# Patient Record
Sex: FEMALE | Race: WHITE | NOT HISPANIC OR LATINO | Employment: OTHER | URBAN - METROPOLITAN AREA
[De-identification: names, ages, dates, MRNs, and addresses within clinical notes are randomized per-mention and may not be internally consistent; named-entity substitution may affect disease eponyms.]

---

## 2017-03-07 ENCOUNTER — OFFICE VISIT - HEALTHEAST (OUTPATIENT)
Dept: FAMILY MEDICINE | Facility: CLINIC | Age: 72
End: 2017-03-07

## 2017-03-07 DIAGNOSIS — H69.90 EUSTACHIAN TUBE DYSFUNCTION: ICD-10-CM

## 2017-03-07 DIAGNOSIS — J30.2 SEASONAL ALLERGIC RHINITIS, UNSPECIFIED ALLERGIC RHINITIS TRIGGER: ICD-10-CM

## 2017-03-07 ASSESSMENT — MIFFLIN-ST. JEOR: SCORE: 1159.46

## 2017-07-19 ENCOUNTER — OFFICE VISIT (OUTPATIENT)
Dept: URGENT CARE | Facility: URGENT CARE | Age: 72
End: 2017-07-19
Payer: COMMERCIAL

## 2017-07-19 ENCOUNTER — COMMUNICATION - HEALTHEAST (OUTPATIENT)
Dept: SCHEDULING | Facility: CLINIC | Age: 72
End: 2017-07-19

## 2017-07-19 VITALS
OXYGEN SATURATION: 99 % | HEART RATE: 68 BPM | DIASTOLIC BLOOD PRESSURE: 68 MMHG | WEIGHT: 133.3 LBS | SYSTOLIC BLOOD PRESSURE: 126 MMHG | TEMPERATURE: 97 F

## 2017-07-19 DIAGNOSIS — J98.01 BRONCHOSPASM: Primary | ICD-10-CM

## 2017-07-19 DIAGNOSIS — R05.9 COUGH: ICD-10-CM

## 2017-07-19 PROCEDURE — 87801 DETECT AGNT MULT DNA AMPLI: CPT | Performed by: FAMILY MEDICINE

## 2017-07-19 PROCEDURE — 99203 OFFICE O/P NEW LOW 30 MIN: CPT | Performed by: FAMILY MEDICINE

## 2017-07-19 RX ORDER — CODEINE PHOSPHATE AND GUAIFENESIN 10; 100 MG/5ML; MG/5ML
2 SOLUTION ORAL EVERY 4 HOURS PRN
Qty: 120 ML | Refills: 0 | Status: SHIPPED | OUTPATIENT
Start: 2017-07-19

## 2017-07-19 RX ORDER — BENZONATATE 200 MG/1
200 CAPSULE ORAL 3 TIMES DAILY PRN
Qty: 30 CAPSULE | Refills: 0 | Status: SHIPPED | OUTPATIENT
Start: 2017-07-19

## 2017-07-19 RX ORDER — PREDNISONE 20 MG/1
40 TABLET ORAL DAILY
Qty: 10 TABLET | Refills: 0 | Status: SHIPPED | OUTPATIENT
Start: 2017-07-19 | End: 2017-07-24

## 2017-07-19 NOTE — NURSING NOTE
Chief Complaint   Patient presents with     Cough     coughing for 1 month with wheezing. Patient went to see a PA 3 days ago. Prescribed some medications, but is not helping yet.       Initial /68 (BP Location: Right arm, Cuff Size: Adult Regular)  Pulse 68  Temp 97  F (36.1  C) (Tympanic)  Wt 133 lb 4.8 oz (60.5 kg)  SpO2 99% There is no height or weight on file to calculate BMI.  Medication Reconciliation: complete   Rupesh Darnell SMA

## 2017-07-19 NOTE — MR AVS SNAPSHOT
After Visit Summary   7/19/2017    Annette Holder    MRN: [id number]           Patient Information     Date Of Birth          1945        Visit Information        Provider Department      7/19/2017 3:05 PM Cordell Ellison MD [facility]        Today's Diagnoses     Bronchospasm    -  1    Cough          Care Instructions    Continue with albuterol inhaler.  Take tessalon perles 200 mg every 6-8 hours as needed for cough.  Okay to take robitussin with codeine for cough, best at bedtime due to drowsiness.  Okay to take prednisone burst for bronchospasm.      Bronchospasm (Adult)    Bronchospasm occurs when the airways (bronchial tubes) go into spasm and contract. This makes it hard to breathe and causes wheezing (a high-pitched whistling sound). Bronchospasm can also cause frequent coughing without wheezing.  Bronchospasm is due to irritation, inflammation, or allergic reaction of the airways. People with asthma get bronchospasm. However, not everyone with bronchospasm has asthma.  Being exposed to harmful fumes, a recent case of bronchitis, exercise, or a flare-up of chronic obstructive pulmonary disease (COPD) may cause the airways to spasm. An episode of bronchospasm may last 7 to 14 days. Medicine may be prescribed to relax the airways and prevent wheezing. Antibiotics will be prescribed only if your healthcare provider thinks there is a bacterial infection. Antibiotics do not help a viral infection.  Home care    Drink lots of water or other fluids (at least 10 glasses a day) during an attack. This will loosen lung secretions and make it easier to breathe. If you have heart or kidney disease, check with your doctor before you drink extra fluids.    Take prescribed medicine exactly at the times advised. If you take an inhaled medicine to help with breathing, do not use it more than once every 4 hours, unless told to do so. If prescribed an antibiotic or prednisone, take all of  the medicine, even if you are feeling better after a few days.    Do not smoke. Also avoid being exposed to secondhand smoke.    If you were given an inhaler, use it exactly as directed. If you need to use it more often than prescribed, your condition may be getting worse. Contact your healthcare provider.  Follow-up care  Follow up with your healthcare provider, or as advised.  Note: If you are age 65 or older, have a chronic lung disease or condition that affects your immune system, or you smoke, we recommend getting pneumococcal vaccinations, as well as an influenza vaccination (flu shot) every autumn. Ask your healthcare provider about this.  When to seek medical advice  Call your healthcare provider right away if any of these occur:    You need to use your inhalers more often than usual.    You develop a fever of 100.4 F (38 C) or higher.    You are coughing up lots of dark-colored sputum (mucus).    You do not start to improve within 24 hours.  Call 911, or get immediate medical care  Contact emergency services if any of these occur:    Coughing up bloody sputum (mucus)    Chest pain with each breath    Increased wheezing or shortness of breath  Date Last Reviewed: 9/13/2015 2000-2017 [employer]. [address] [place] [address]. All rights reserved. This information is not intended as a substitute for professional medical care. Always follow your healthcare professional's instructions.        Cough, Chronic, Uncertain Cause (Adult)    Everyone has had a cough as part of the common cold, flu, or bronchitis. This kind of cough occurs along with an achy feeling, low-grade fever, nasal and sinus congestion, and a scratchy or sore throat. This usually gets better in 2 to 3 weeks. A cough that lasts longer than 3 weeks may be due to other causes.  If your cough does not improve over the next 2 weeks, further testing may be needed. Follow up with your healthcare provider as advised. Cough  suppressants may be recommended. Based on your exam today, the exact cause of your cough is not certain. Below are some common causes for persistent cough.  Smokers cough  Smoker s cough doesn t go away. If you continue to smoke, it only gets worse. The cough is from irritation in the air passages. Talk to your healthcare provider about quitting. Medicines or nicotine-replacement products, like gum or the patch, may make quitting easier.  Postnasal drip  A cough that is worse at night may be due to postnasal drip. Excess mucus in the nose drains from the back of your nose to your throat. This triggers the cough reflex. Postnasal drip may be due to a sinus infection or allergy. Common allergens include dust, tobacco smoke (both inhaled and secondhand smoke), environmental pollutants, pollen, mold, pets, cleaning agents, room deodorizers, and chemical fumes. Over-the-counter antihistamines or decongestants may be helpful for allergies. A sinus infection may requires antibiotic treatment. See your healthcare provider if symptoms continue.  Medicines  Certain prescribed medicines can cause a chronic cough in some people:    ACE inhibitors for high blood pressure. These include benazepril, captopril, enalapril, fosinopril, lisinopril, quinapril, ramipril, and others.    Beta-blockers for high blood pressure and other conditions. These include propranolol, atenolol, metoprolol, nadolol, and others.  Let your healthcare provider know if you are taking any of these.  Asthma  Cough may be the only sign of mild asthma. You may have tests to find out if asthma is causing your cough. You may also take asthma medicine on a trial basis.  Acid reflux (heartburn, GERD)  The esophagus is the tube that carries food from the mouth to the stomach. A valve at its lower end prevents stomach acids from flowing upward. If this valve does not work properly, acid from the stomach enters the esophagus. This may cause a burning pain in the  upper abdomen or lower chest, belching, or cough. Symptoms are often worse when lying flat. Avoid eating or drinking before bedtime. Try using extra pillows to raise your upper body, or place 4-inch blocks under the head of your bed. You may try an over-the-counter antacid or an acid-blocking medicine such as famotidine, cimetidine, ranitidine, esomeprazole, lansoprazole, or omeprazole. Stronger medicines for this condition can be prescribed by your healthcare provider.  Follow-up care  Follow up with your healthcare provider, or as advised, if your cough does not improve. Further testing may be needed.  Note: If an X-ray was taken, a specialist will review it. You will be notified of any new findings that may affect your care.  When to seek medical advice  Call your healthcare provider right away if any of these occur:    Mild wheezing or difficulty breathing    Fever of 100.4 F (38 C) or higher, or as directed by your healthcare provider    Unexpected weight loss    Coughing up large amounts of colored sputum    Night sweats (sheets and pajamas get soaking wet)  Call 911, or get immediate medical care  Contact emergency services right away if any of these occur:    Coughing up blood    Moderate to severe trouble breathing or wheezing  Date Last Reviewed: 9/13/2015 2000-2017 [employer]. [address] [place] [address], [place] [address]. All rights reserved. This information is not intended as a substitute for professional medical care. Always follow your healthcare professional's instructions.        Whooping Cough (Pertussis) (Adult)  Whooping cough (pertussis) is a bacterial infection in the respiratory tract. It can be a very serious infection in infants and older adults. In healthy older children and adults, it is generally mild.  Pertussis is highly contagious. The infection is spread through the air by coughing or sneezing. The illness starts like an ordinary cold with mild cough, congestion, and  "low fever. Symptoms then develop that may include:    Coughing spells that cause a \"whooping\" sound when breathing in    Gagging or vomiting after coughing    Poor appetite    Feeling very tired    Thick mucus in the nose and throat  Antibiotics are used to treat this illness. Even with treatment, it may take up to 3 months for the cough to go away completely.  Vaccination prevents pertussis in children. The vaccine effect lessens after 5 to 10 years. So a booster vaccine is often needed. Teens and adults who were vaccinated as children and have not had a booster can be infected and may spread the infection to unvaccinated infants and children. Be sure to ask your healthcare provider whether anyone in your household needs a booster vaccination.  Home Care    Take all medicine as prescribed by the healthcare provider. Be sure to take antibiotics as directed until they are gone, even if you feel better. If you do not finish the antibiotics, the infection may come back and be harder to treat.    Rest and get plenty of sleep.    Stay home from work or school until you have completed at least 5 days of antibiotic treatment. If antibiotics are not used, stay home until 21 days after you first had symptoms of a cough. When resuming activity, go back to your normal routine gradually.    Avoid exposure to cigarette smoke.    Ask your healthcare provider before taking over-the-counter medicine for fever, pain, and coughing.    To avoid loss of fluids (dehydration), try drinking 6-8 glasses of fluids (water, juice, tea, soup) a day. Fluids will help loosen secretions in the nose and lungs.    Cover your mouth and nose when you sneeze or cough. Dispose of any tissues properly.    Wash your hands well with soap and water after you cough or sneeze, and frequently throughout the day.  Follow-up care  Follow up with your healthcare provider as advised.  When to seek medical advice  Call your healthcare provider right away for any " "of the following:    Trouble breathing or painful breathing    Cough with repeated gagging or vomiting    Coughing up colored or bloody mucus    Severe headache or face, neck, or ear pain    Fever over 100.4 F (38.0 C) for more than 3 days    You don't start improving within 1 week  Date Last Reviewed: 9/25/2015 2000-2017 [employer]. [address] [place] [address]. All rights reserved. This information is not intended as a substitute for professional medical care. Always follow your healthcare professional's instructions.                Follow-ups after your visit        Who to contact     If you have questions or need follow up information about today's clinic visit or your schedule please contact [facility] directly at [phone].  Normal or non-critical lab and imaging results will be communicated to you by MyChart, letter or phone within 4 business days after the clinic has received the results. If you do not hear from us within 7 days, please contact the clinic through [employer] or phone. If you have a critical or abnormal lab result, we will notify you by phone as soon as possible.  Submit refill requests through [employer] or call your pharmacy and they will forward the refill request to us. Please allow 3 business days for your refill to be completed.          Additional Information About Your Visit        [employer] Information     [employer] lets you send messages to your doctor, view your test results, renew your prescriptions, schedule appointments and more. To sign up, go to [email or web address] . Click on \"Log in\" on the left side of the screen, which will take you to the Welcome page. Then click on \"Sign up Now\" on the right side of the page.     You will be asked to enter the access code listed below, as well as some personal information. Please follow the directions to create your username and password.     Your access code is: [email or web address]  Expires: 10/17/2017 "  3:40 PM     Your access code will  in 90 days. If you need help or a new code, please call your [facility] clinic or [phone].        Care EveryWhere ID     This is your Care EveryWhere ID. This could be used by other organizations to access your [facility] medical records  [id number]        Your Vitals Were     Pulse Temperature Pulse Oximetry             68 97  F (36.1  C) (Tympanic) 99%          Blood Pressure from Last 3 Encounters:   17 126/68    Weight from Last 3 Encounters:   17 133 lb 4.8 oz (60.5 kg)              We Performed the Following     Bordetella per/paraper PCR          Today's Medication Changes          These changes are accurate as of: 17  3:40 PM.  If you have any questions, ask your nurse or doctor.               Start taking these medicines.        Dose/Directions    benzonatate 200 MG capsule   Commonly known as:  TESSALON   Used for:  Cough   Started by:  Cordell Ellison MD        Dose:  200 mg   Take 1 capsule (200 mg) by mouth 3 times daily as needed for cough   Quantity:  30 capsule   Refills:  0       guaiFENesin-codeine 100-10 MG/5ML Soln solution   Commonly known as:  ROBITUSSIN AC   Used for:  Cough   Started by:  Cordell Ellison MD        Dose:  2 tsp.   Take 10 mLs by mouth every 4 hours as needed   Quantity:  120 mL   Refills:  0       predniSONE 20 MG tablet   Commonly known as:  DELTASONE   Used for:  Bronchospasm   Started by:  Cordell Ellison MD        Dose:  40 mg   Take 2 tablets (40 mg) by mouth daily for 5 days   Quantity:  10 tablet   Refills:  0            Where to get your medicines      These medications were sent to [facility] [address] [place] [address] [address] [facility]  [address] [place] [id number]     Phone:  [phone]     benzonatate 200 MG capsule         Some of these will need a paper prescription and others can be bought over the counter.  Ask your nurse if you have questions.     Bring a paper prescription for  each of these medications     guaiFENesin-codeine 100-10 MG/5ML Soln solution    predniSONE 20 MG tablet                Primary Care Provider Office Phone # Fax #    [facility] [phone]       Internal Medicine Department [address] [place] [address]        Equal Access to Services     MELISSA TOBIN : Hadii aad ku hadasho Soomaali, waaxda luqadaha, qaybta kaalmada adeegyada, waxay benin haystephanian adedina hiro larodrigo sahu. So [facility] [phone].    ATENCIÓN: Si habla español, tiene a sousa disposición servicios gratuitos de asistencia lingüística. Fili al [phone].    We comply with applicable federal civil rights laws and [place] laws. We do not discriminate on the basis of race, color, national origin, age, disability sex, sexual orientation or gender identity.            Thank you!     Thank you for choosing [facility]  for your care. Our goal is always to provide you with excellent care. Hearing back from our patients is one way we can continue to improve our services. Please take a few minutes to complete the written survey that you may receive in the mail after your visit with us. Thank you!             Your Updated Medication List - Protect others around you: Learn how to safely use, store and throw away your medicines at www.disposemymeds.org.          This list is accurate as of: 7/19/17  3:40 PM.  Always use your most recent med list.                   Brand Name Dispense Instructions for use Diagnosis    benzonatate 200 MG capsule    TESSALON    30 capsule    Take 1 capsule (200 mg) by mouth 3 times daily as needed for cough    Cough       guaiFENesin-codeine 100-10 MG/5ML Soln solution    ROBITUSSIN AC    120 mL    Take 10 mLs by mouth every 4 hours as needed    Cough       predniSONE 20 MG tablet    DELTASONE    10 tablet    Take 2 tablets (40 mg) by mouth daily for 5 days    Bronchospasm

## 2017-07-19 NOTE — PATIENT INSTRUCTIONS
Continue with albuterol inhaler.  Take tessalon perles 200 mg every 6-8 hours as needed for cough.  Okay to take robitussin with codeine for cough, best at bedtime due to drowsiness.  Okay to take prednisone burst for bronchospasm.      Bronchospasm (Adult)    Bronchospasm occurs when the airways (bronchial tubes) go into spasm and contract. This makes it hard to breathe and causes wheezing (a high-pitched whistling sound). Bronchospasm can also cause frequent coughing without wheezing.  Bronchospasm is due to irritation, inflammation, or allergic reaction of the airways. People with asthma get bronchospasm. However, not everyone with bronchospasm has asthma.  Being exposed to harmful fumes, a recent case of bronchitis, exercise, or a flare-up of chronic obstructive pulmonary disease (COPD) may cause the airways to spasm. An episode of bronchospasm may last 7 to 14 days. Medicine may be prescribed to relax the airways and prevent wheezing. Antibiotics will be prescribed only if your healthcare provider thinks there is a bacterial infection. Antibiotics do not help a viral infection.  Home care    Drink lots of water or other fluids (at least 10 glasses a day) during an attack. This will loosen lung secretions and make it easier to breathe. If you have heart or kidney disease, check with your doctor before you drink extra fluids.    Take prescribed medicine exactly at the times advised. If you take an inhaled medicine to help with breathing, do not use it more than once every 4 hours, unless told to do so. If prescribed an antibiotic or prednisone, take all of the medicine, even if you are feeling better after a few days.    Do not smoke. Also avoid being exposed to secondhand smoke.    If you were given an inhaler, use it exactly as directed. If you need to use it more often than prescribed, your condition may be getting worse. Contact your healthcare provider.  Follow-up care  Follow up with your healthcare  provider, or as advised.  Note: If you are age 65 or older, have a chronic lung disease or condition that affects your immune system, or you smoke, we recommend getting pneumococcal vaccinations, as well as an influenza vaccination (flu shot) every autumn. Ask your healthcare provider about this.  When to seek medical advice  Call your healthcare provider right away if any of these occur:    You need to use your inhalers more often than usual.    You develop a fever of 100.4 F (38 C) or higher.    You are coughing up lots of dark-colored sputum (mucus).    You do not start to improve within 24 hours.  Call 911, or get immediate medical care  Contact emergency services if any of these occur:    Coughing up bloody sputum (mucus)    Chest pain with each breath    Increased wheezing or shortness of breath  Date Last Reviewed: 9/13/2015 2000-2017 [employer]. [address] [place] [address]. All rights reserved. This information is not intended as a substitute for professional medical care. Always follow your healthcare professional's instructions.        Cough, Chronic, Uncertain Cause (Adult)    Everyone has had a cough as part of the common cold, flu, or bronchitis. This kind of cough occurs along with an achy feeling, low-grade fever, nasal and sinus congestion, and a scratchy or sore throat. This usually gets better in 2 to 3 weeks. A cough that lasts longer than 3 weeks may be due to other causes.  If your cough does not improve over the next 2 weeks, further testing may be needed. Follow up with your healthcare provider as advised. Cough suppressants may be recommended. Based on your exam today, the exact cause of your cough is not certain. Below are some common causes for persistent cough.  Smokers cough  Smoker s cough doesn t go away. If you continue to smoke, it only gets worse. The cough is from irritation in the air passages. Talk to your healthcare provider about quitting.  Medicines or nicotine-replacement products, like gum or the patch, may make quitting easier.  Postnasal drip  A cough that is worse at night may be due to postnasal drip. Excess mucus in the nose drains from the back of your nose to your throat. This triggers the cough reflex. Postnasal drip may be due to a sinus infection or allergy. Common allergens include dust, tobacco smoke (both inhaled and secondhand smoke), environmental pollutants, pollen, mold, pets, cleaning agents, room deodorizers, and chemical fumes. Over-the-counter antihistamines or decongestants may be helpful for allergies. A sinus infection may requires antibiotic treatment. See your healthcare provider if symptoms continue.  Medicines  Certain prescribed medicines can cause a chronic cough in some people:    ACE inhibitors for high blood pressure. These include benazepril, captopril, enalapril, fosinopril, lisinopril, quinapril, ramipril, and others.    Beta-blockers for high blood pressure and other conditions. These include propranolol, atenolol, metoprolol, nadolol, and others.  Let your healthcare provider know if you are taking any of these.  Asthma  Cough may be the only sign of mild asthma. You may have tests to find out if asthma is causing your cough. You may also take asthma medicine on a trial basis.  Acid reflux (heartburn, GERD)  The esophagus is the tube that carries food from the mouth to the stomach. A valve at its lower end prevents stomach acids from flowing upward. If this valve does not work properly, acid from the stomach enters the esophagus. This may cause a burning pain in the upper abdomen or lower chest, belching, or cough. Symptoms are often worse when lying flat. Avoid eating or drinking before bedtime. Try using extra pillows to raise your upper body, or place 4-inch blocks under the head of your bed. You may try an over-the-counter antacid or an acid-blocking medicine such as famotidine, cimetidine, ranitidine,  "esomeprazole, lansoprazole, or omeprazole. Stronger medicines for this condition can be prescribed by your healthcare provider.  Follow-up care  Follow up with your healthcare provider, or as advised, if your cough does not improve. Further testing may be needed.  Note: If an X-ray was taken, a specialist will review it. You will be notified of any new findings that may affect your care.  When to seek medical advice  Call your healthcare provider right away if any of these occur:    Mild wheezing or difficulty breathing    Fever of 100.4 F (38 C) or higher, or as directed by your healthcare provider    Unexpected weight loss    Coughing up large amounts of colored sputum    Night sweats (sheets and pajamas get soaking wet)  Call 911, or get immediate medical care  Contact emergency services right away if any of these occur:    Coughing up blood    Moderate to severe trouble breathing or wheezing  Date Last Reviewed: 9/13/2015 2000-2017 [employer]. [address] [place] [address]. All rights reserved. This information is not intended as a substitute for professional medical care. Always follow your healthcare professional's instructions.        Whooping Cough (Pertussis) (Adult)  Whooping cough (pertussis) is a bacterial infection in the respiratory tract. It can be a very serious infection in infants and older adults. In healthy older children and adults, it is generally mild.  Pertussis is highly contagious. The infection is spread through the air by coughing or sneezing. The illness starts like an ordinary cold with mild cough, congestion, and low fever. Symptoms then develop that may include:    Coughing spells that cause a \"whooping\" sound when breathing in    Gagging or vomiting after coughing    Poor appetite    Feeling very tired    Thick mucus in the nose and throat  Antibiotics are used to treat this illness. Even with treatment, it may take up to 3 months for the cough to go " away completely.  Vaccination prevents pertussis in children. The vaccine effect lessens after 5 to 10 years. So a booster vaccine is often needed. Teens and adults who were vaccinated as children and have not had a booster can be infected and may spread the infection to unvaccinated infants and children. Be sure to ask your healthcare provider whether anyone in your household needs a booster vaccination.  Home Care    Take all medicine as prescribed by the healthcare provider. Be sure to take antibiotics as directed until they are gone, even if you feel better. If you do not finish the antibiotics, the infection may come back and be harder to treat.    Rest and get plenty of sleep.    Stay home from work or school until you have completed at least 5 days of antibiotic treatment. If antibiotics are not used, stay home until 21 days after you first had symptoms of a cough. When resuming activity, go back to your normal routine gradually.    Avoid exposure to cigarette smoke.    Ask your healthcare provider before taking over-the-counter medicine for fever, pain, and coughing.    To avoid loss of fluids (dehydration), try drinking 6-8 glasses of fluids (water, juice, tea, soup) a day. Fluids will help loosen secretions in the nose and lungs.    Cover your mouth and nose when you sneeze or cough. Dispose of any tissues properly.    Wash your hands well with soap and water after you cough or sneeze, and frequently throughout the day.  Follow-up care  Follow up with your healthcare provider as advised.  When to seek medical advice  Call your healthcare provider right away for any of the following:    Trouble breathing or painful breathing    Cough with repeated gagging or vomiting    Coughing up colored or bloody mucus    Severe headache or face, neck, or ear pain    Fever over 100.4 F (38.0 C) for more than 3 days    You don't start improving within 1 week  Date Last Reviewed: 9/25/2015 2000-2017 [employer] [place]. [address] [place] [address], [place] [address]. All rights reserved. This information is not intended as a substitute for professional medical care. Always follow your healthcare professional's instructions.

## 2017-07-19 NOTE — PROGRESS NOTES
SUBJECTIVE:   Annette Holder is a 72 year old female presenting with a chief complaint of cough and wheezing .  Patient was seen in a [facility] about 3 days ago, given inhaler and tessalon perles but not helping.  Denies any fever.  Patient has laryngitis for several days.  No fever, no congestion, no SOB.  Had C.diff in the past, not keen on antibiotic use unless necessary.  Onset of symptoms was 1 month(s) ago.  Course of illness is worsening.    Severity moderate  Current and Associated symptoms: cough - productive, intermittent.  Treatment measures tried include albuterol inhaler, tessalon perles.  Predisposing factors include None.    Tdap 2011.  Primary care thru [facility] and [facility].    History reviewed. No pertinent past medical history.  No current outpatient prescriptions on file.     Social History   Substance Use Topics     Smoking status: Never Smoker     Smokeless tobacco: Never Used     Alcohol use Yes       ROS:  CONSTITUTIONAL:NEGATIVE for fever, chills, change in weight  INTEGUMENTARY/SKIN: NEGATIVE for worrisome rashes, moles or lesions  ENT/MOUTH: NEGATIVE for ear, mouth and throat problems and POSITIVE for hoarseness  RESP:POSITIVE for cough-non productive, cough-productive and wheezing  CV: NEGATIVE for chest pain, palpitations or peripheral edema  GI: NEGATIVE for nausea, abdominal pain, heartburn, or change in bowel habits    OBJECTIVE  :/68 (BP Location: Right arm, Cuff Size: Adult Regular)  Pulse 68  Temp 97  F (36.1  C) (Tympanic)  Wt 133 lb 4.8 oz (60.5 kg)  SpO2 99%  GENERAL APPEARANCE: healthy, alert and no distress  EYES: EOMI,  PERRL, conjunctiva clear  HENT: ear canals and TM's normal.  Nose and mouth without ulcers, erythema or lesions  NECK: supple, nontender, no lymphadenopathy  RESP: lungs clear to auscultation - no rales, rhonchi or wheezes  CV: regular rates and rhythm, normal S1 S2, no murmur noted  NEURO: Normal strength and tone, sensory exam grossly  normal,  normal speech and mentation  SKIN: no suspicious lesions or rashes  PSYCH: mentation appears normal and affect normal/bright    ASSESSMENT/PLAN:  (J98.01) Bronchospasm  (primary encounter diagnosis)  Plan: predniSONE (DELTASONE) 20 MG tablet            (R05) Cough  Comment: unclear  Plan: Bordetella per/paraper PCR, benzonatate         (TESSALON) 200 MG capsule, guaiFENesin-codeine         (ROBITUSSIN AC) 100-10 MG/5ML SOLN solution            Patient reports cough for the past 1 month, appear well and discussed etiology for chronic cough.  Will obtain pertussis screen and if positive will treat (patient with history of C.diff with antibiotic and wants to be careful about antibiotic usage).  Reviewed treatment for possible bronchospasm with prednisone burst as patient reports no improvement with albuterol inhaler.  RX for tessalon perles and blanca AC given to help with cough.    Encourage to follow up with primary provider if no resolution of symptoms.    Cordell Ellison MD  July 19, 2017 4:04 PM

## 2017-07-21 ENCOUNTER — TELEPHONE (OUTPATIENT)
Dept: PEDIATRICS | Facility: CLINIC | Age: 72
End: 2017-07-21

## 2017-07-21 ENCOUNTER — TELEPHONE (OUTPATIENT)
Dept: URGENT CARE | Facility: URGENT CARE | Age: 72
End: 2017-07-21

## 2017-07-21 LAB
B PERT+PARAPERT DNA PNL SPEC NAA+PROBE: NORMAL
SPECIMEN SOURCE: NORMAL

## 2017-07-21 NOTE — TELEPHONE ENCOUNTER
Patient was seen for cough and at the time was offered to do an x-ray but declined. States now she would like to have the x-ray ordered. Would like to do today if possible. Please advise. [phone] ok to lm or talk to her   Aaliyah Smart, RN

## 2017-07-21 NOTE — TELEPHONE ENCOUNTER
SUBJECTIVE:  Patient called earlier today asking to have a chest x-ray ordered.  Patient was evaluated at the [facility] by Dr. Ellison on July 19, 2017.  Patient had declined an offer for a chest x-ray during that clinic encounter;however, patient would like to proceed with a chest x-ray for further evaluation of the one-month h/o cough and wheezing.     PLAN:  Please call the patient saying that she may return to the urgent care clinic or to her primary care provider for a chest x-ray.  This will be considered a separate clinic encounter.     Kofi Lewis MD

## 2017-07-21 NOTE — TELEPHONE ENCOUNTER
Spoke with patients son, informed to have pt call back to the clinic.   Siobhan Fregoso CMA ([facility]) 7/21/2017 11:43 AM

## 2017-07-25 NOTE — TELEPHONE ENCOUNTER
Spoke with patient to inform that she will need to be seen in order to have xray done. Pt states feeling better and probably will not need it.  Siobhan Fregoso CMA ([facility]) 7/25/2017 12:49 PM

## 2018-07-03 ENCOUNTER — AMBULATORY - HEALTHEAST (OUTPATIENT)
Dept: FAMILY MEDICINE | Facility: CLINIC | Age: 73
End: 2018-07-03

## 2018-07-03 DIAGNOSIS — R30.0 DYSURIA: ICD-10-CM

## 2018-07-05 ENCOUNTER — OFFICE VISIT - HEALTHEAST (OUTPATIENT)
Dept: FAMILY MEDICINE | Facility: CLINIC | Age: 73
End: 2018-07-05

## 2018-07-05 DIAGNOSIS — N90.4 LICHEN SCLEROSUS OF FEMALE GENITALIA: ICD-10-CM

## 2018-07-05 DIAGNOSIS — R30.0 DYSURIA: ICD-10-CM

## 2018-07-05 LAB
ALBUMIN UR-MCNC: ABNORMAL MG/DL
APPEARANCE UR: CLEAR
BACTERIA #/AREA URNS HPF: ABNORMAL HPF
BILIRUB UR QL STRIP: NEGATIVE
COLOR UR AUTO: YELLOW
GLUCOSE UR STRIP-MCNC: NEGATIVE MG/DL
HGB UR QL STRIP: ABNORMAL
KETONES UR STRIP-MCNC: NEGATIVE MG/DL
LEUKOCYTE ESTERASE UR QL STRIP: ABNORMAL
MUCOUS THREADS #/AREA URNS LPF: ABNORMAL LPF
NITRATE UR QL: NEGATIVE
PH UR STRIP: 5.5 [PH] (ref 5–8)
RBC #/AREA URNS AUTO: ABNORMAL HPF
SP GR UR STRIP: 1.01 (ref 1–1.03)
SQUAMOUS #/AREA URNS AUTO: ABNORMAL LPF
TRANS CELLS #/AREA URNS HPF: ABNORMAL LPF
UROBILINOGEN UR STRIP-ACNC: ABNORMAL
WBC #/AREA URNS AUTO: ABNORMAL HPF

## 2018-07-06 LAB — BACTERIA SPEC CULT: NORMAL

## 2018-07-07 ENCOUNTER — COMMUNICATION - HEALTHEAST (OUTPATIENT)
Dept: FAMILY MEDICINE | Facility: CLINIC | Age: 73
End: 2018-07-07

## 2018-07-16 ENCOUNTER — COMMUNICATION - HEALTHEAST (OUTPATIENT)
Dept: FAMILY MEDICINE | Facility: CLINIC | Age: 73
End: 2018-07-16

## 2018-07-16 ENCOUNTER — OFFICE VISIT - HEALTHEAST (OUTPATIENT)
Dept: FAMILY MEDICINE | Facility: CLINIC | Age: 73
End: 2018-07-16

## 2018-07-16 DIAGNOSIS — N90.4 LICHEN SCLEROSUS OF FEMALE GENITALIA: ICD-10-CM

## 2018-07-16 ASSESSMENT — MIFFLIN-ST. JEOR: SCORE: 1159.46

## 2018-07-30 ENCOUNTER — COMMUNICATION - HEALTHEAST (OUTPATIENT)
Dept: ADMINISTRATIVE | Facility: CLINIC | Age: 73
End: 2018-07-30

## 2019-07-23 ENCOUNTER — RECORDS - HEALTHEAST (OUTPATIENT)
Dept: ADMINISTRATIVE | Facility: OTHER | Age: 74
End: 2019-07-23

## 2019-11-19 ENCOUNTER — RECORDS - HEALTHEAST (OUTPATIENT)
Dept: ADMINISTRATIVE | Facility: OTHER | Age: 74
End: 2019-11-19

## 2021-05-25 ENCOUNTER — RECORDS - HEALTHEAST (OUTPATIENT)
Dept: ADMINISTRATIVE | Facility: CLINIC | Age: 76
End: 2021-05-25

## 2021-05-30 VITALS — WEIGHT: 138 LBS | BODY MASS INDEX: 20.92 KG/M2 | HEIGHT: 68 IN

## 2021-06-01 VITALS — BODY MASS INDEX: 20.92 KG/M2 | HEIGHT: 68 IN | WEIGHT: 138 LBS

## 2021-06-01 VITALS — WEIGHT: 135.75 LBS | BODY MASS INDEX: 20.64 KG/M2

## 2021-06-09 NOTE — PROGRESS NOTES
Subjective:   Annette comes in today with some left-sided nasal congestion and some left-sided ear popping and crackling.  She does not have any decreased hearing at all.  She denies fevers, sweats or chills.  There has been no purulent secretions.  She has recently gotten back from a stay in [place].  She denies shortness of breath.  She denies chest pains of any kind.  Her biggest concern is the cracking noise she gets in the left ear.  She states she is on antibiotics twice in [place] for ear infection.  She now has been using some Flonase nasal spray to help with allergy.  She takes once a day allergy tablet.      Objective:  HEENT: Both TMs are gray.  There is a small amount of wax laying up against the left TM.  No ear canal erythema noted.  No perforation present.  No exudate noted.  The sinuses are nontender to palpation.  Conjunctivae are clear.  The nasal mucosa is quite inflamed and erythematous.  There is decreased air flow noted to the left nares.  Pharynx today is clear  Neck: Neck reveals no lymphadenopathy.  Lungs: Lungs today are clear.  Patient's in no respiratory distress.  Cardiac: There is a regular rhythm present.  No murmur heard.      Assessment:  1.  Ear crackling possibly secondary to eustachian tube dysfunction.  Rule out earwax symptom as it is laying up against the eardrum  2.  Allergic rhinitis      Plan:  The patient will continue her Flonase nasal spray.  Continue antihistamines daily.  She will take extra liquids.  The ear was lavaged today in hopes that this helps her symptoms.  She will update me within the week with her progress.  If her symptoms would persist she will try using Mucinex D.  If that is of no help she will see the otolaryngologist.  She is thinking about getting hearing aids so she wants to make sure her ears at this point are healthy.

## 2021-06-19 NOTE — PROGRESS NOTES
"OFFICE VISIT - FAMILY MEDICINE     ASSESSMENT AND PLAN     1. Lichen sclerosus of female genitalia  clobetasol (TEMOVATE) 0.05 % ointment   2. Dysuria  Urinalysis-UC if Indicated    Culture, Urine   Treatment option of lichen sclerosis discussed with the patient, she will start clobetasol every night for the next 6-8 weeks, then taper down to 2- 3 times a week as her symptoms improve, possible side effect discussed, follow-up in about 3-4 weeks.  Mild dysuria with abnormal urine analysis, awaiting culture.    CHIEF COMPLAINT   Vaginal dryness (for many years pt has experienced vaginal dryness, had used estrace previously but was having adverse side effects to this, has been using something called \"Sliquid\"); Urinary Urgency (x 6 weeks, urgency that comes and goes); and Dysuria (burning)    HPI   Annette Holder is a 73 y.o. female.  No Patient Care Coordination Note on file.  She has had history of vaginal dryness for many years, has been on Estrace cream in the past with some improvement, but at one point she starting developing some mild burning and itching, at that time her gynecologist at [place] did tell her to stop the Estrace and start doing the \"Sliquid\", initially was doing fine, but for the past week, she has been noticing more itching or burning, more pressure in her vagina area.,  Has also experienced urgency but no change in urine color orders, no flank pain no nausea no vomiting.  She also try over-the-counter Monistat with some mild relief lasting for a couple of days only.  She mention a history of C. difficile colitis in the past and he to have a long time to get rid of it.    Review of Systems As per HPI, otherwise negative.    OBJECTIVE   /74 (Patient Site: Left Arm, Patient Position: Sitting, Cuff Size: Adult Regular)  Pulse 64  Wt 135 lb 12 oz (61.6 kg)  SpO2 98%  BMI 20.64 kg/m2  Physical Exam   Constitutional: She is oriented to person, place, and time. She appears well-developed " and well-nourished.   HENT:   Head: Normocephalic and atraumatic.   Neck: Normal range of motion. Neck supple. No JVD present. No tracheal deviation present. No thyromegaly present.   Cardiovascular: Normal rate, regular rhythm, normal heart sounds and intact distal pulses.  Exam reveals no gallop and no friction rub.    No murmur heard.  Pulmonary/Chest: Effort normal and breath sounds normal. No respiratory distress. She has no wheezes. She has no rales.   Abdominal: She exhibits no distension. There is no tenderness. There is no rebound and no guarding.   Genitourinary:   Genitourinary Comments: Atrophic vulvar with dryness.  Exam chaperoned by yakov Marino CNA.   Musculoskeletal: She exhibits no edema or tenderness.   Lymphadenopathy:     She has no cervical adenopathy.   Neurological: She is alert and oriented to person, place, and time. Coordination normal.   Psychiatric: She has a normal mood and affect. Judgment and thought content normal.       PFSH   No family history on file.  Social History     Social History     Marital status:      Spouse name: N/A     Number of children: N/A     Years of education: N/A     Occupational History     Not on file.     Social History Main Topics     Smoking status: Never Smoker     Smokeless tobacco: Never Used     Alcohol use Not on file     Drug use: Not on file     Sexual activity: Not on file     Other Topics Concern     Not on file     Social History Narrative     Relevant history was reviewed with the patient today, unless noted in HPI, nothing is pertinent for this visit.  [facility]     Patient Active Problem List    Diagnosis Date Noted     Lichen sclerosus of female genitalia 07/05/2018     Hypernatremia      Overview Note:     Created by Conversion         Hyperkalemia      Overview Note:     Created by Conversion         Tricuspid Regurgitation      Overview Note:     Created by Conversion  [facility] Annotation: Jan 13 2012  1:11PM - Javier Trotter:  Echo done   at [facility] 12/11 revealed mild tricuspid regurgitation.    Replacement Utility updated for latest IMO load       Cough      Overview Note:     Created by Conversion         Pain In The Arms      Overview Note:     Created by Conversion         No past surgical history on file.    RESULTS/CONSULTS (Lab/Rad)     Recent Results (from the past 168 hour(s))   Urinalysis-UC if Indicated   Result Value Ref Range    Color, UA Yellow Colorless, Yellow, Straw, Light Yellow    Clarity, UA Clear Clear    Glucose, UA Negative Negative    Bilirubin, UA Negative Negative    Ketones, UA Negative Negative    Specific Gravity, UA 1.015 1.005 - 1.030    Blood, UA Trace (!) Negative    pH, UA 5.5 5.0 - 8.0    Protein, UA Trace (!) Negative mg/dL    Urobilinogen, UA 0.2 E.U./dL 0.2 E.U./dL, 1.0 E.U./dL    Nitrite, UA Negative Negative    Leukocytes, UA Large (!) Negative    Bacteria, UA Moderate (!) None Seen hpf    RBC, UA 0-2 None Seen, 0-2 hpf    WBC, UA  (!) None Seen, 0-5 hpf    Squam Epithel, UA 10-25 (!) None Seen, 0-5 lpf    Trans Epithel, UA 0-5 (!) None Seen lpf    Mucus, UA Few (!) None Seen lpf     No results found.  MEDICATIONS     No current outpatient prescriptions on file prior to visit.     No current facility-administered medications on file prior to visit.        HEALTH MAINTENANCE / SCREENING   PHQ-2 Total Score: 0 (7/5/2018  9:41 AM), No Data Recorded,No Data Recorded  Immunization History   Administered Date(s) Administered     Influenza C4g5-41, 12/17/2009     Influenza high dose, seasonal 10/01/2014, 09/01/2015     Influenza, Seasonal, Inj PF IIV3 12/11/2008     Influenza, inj, historic,unspecified 10/15/2009, 10/15/2010, 09/01/2011, 11/01/2012, 10/01/2013, 10/31/2013, 09/29/2016, 10/01/2017     Influenza,seasonal, Inj IIV3 11/07/2002, 11/13/2003, 11/08/2005, 10/26/2006, 10/24/2007, 10/31/2013     Pneumo Conj 13-V (2010&after) 07/08/2015     Pneumo Polysac 23-V 12/01/2009     Td, Adult, Absorbed  1944, 01/18/2001     Td,adult,historic,unspecified 1944     Tdap 12/01/2011     ZOSTER, LIVE 04/07/2008     Health Maintenance   Topic     ADVANCE DIRECTIVES DISCUSSED WITH PATIENT      DXA SCAN      FALL RISK ASSESSMENT      INFLUENZA VACCINE RULE BASED (1)     TD 18+ HE      COLONOSCOPY      PNEUMOCOCCAL POLYSACCHARIDE VACCINE AGE 65 AND OVER      PNEUMOCOCCAL CONJUGATE VACCINE FOR ADULTS (PCV13 OR PREVNAR)      ZOSTER VACCINE      MAMMOGRAM        Adam Ramirez MD  Family Medicine, [facility]     This note was dictated using a voice recognition software.  Any grammatical or context distortion are unintentional and inherent to the software.

## 2021-06-19 NOTE — PROGRESS NOTES
ASSESMENT AND PLAN:  Diagnoses and all orders for this visit:    Lichen sclerosus of female genitalia versus other.    Reviewed the previous clinic note from [facility] with the patient and counseled and reviewed differential diagnosis with the patient.  I encouraged her to complete her course of topical steroid therapy as recommended by Dr. Ramirez and in the meantime she is going to pursue further evaluation and possible further treatment with referral to gynecology as ordered below.  Patient was given the phone numbers for [facility] and [facility] and will call to schedule.  -     Ambulatory referral to Gynecology    Over 15 minutes of total time face-to-face, more than half in counseling and coronation of care on the above.      SUBJECTIVE: 73-year-old female here for follow-up on her recent clinic visit at [facility] during which she was diagnosed with lichen sclerosis and started on topical steroid therapy.  She has been using the treatment for less than 2 weeks and has not yet noticed much improvement in the burning and pressure and irritation in the vaginal area.  She is also concerned because the pharmacist told her that she should not use the steroid for longer than 2 weeks, whereas her prescription had been for a longer course of treatment with a tapering thereafter.  Patient had gotten care at a gynecology clinic in the past at [facility] but was unhappy with her most recent provider there.  She would like to consider getting a gynecologist to evaluate her here in the [facility].  No vaginal bleeding or spotting, no blood in the urine.  Patient would like to get her evaluation completed in a timely manner because of upcoming travel.    No past medical history on file.  Patient Active Problem List   Diagnosis     Tricuspid Regurgitation     Cough     Pain In The Arms     Hypernatremia     Hyperkalemia     Lichen sclerosus of female genitalia     Current Outpatient Prescriptions  "  Medication Sig Dispense Refill     aspirin 81 mg chewable tablet Chew 81 mg daily.       calcium carbonate (CALCIUM 500 ORAL) Take by mouth.       cetirizine (ZYRTEC) 10 MG tablet Take 10 mg by mouth daily.       clobetasol (TEMOVATE) 0.05 % ointment Apply a small amount topically at bedtime for 6 weeks,then 2-3 times a week for 4 weeks. 60 g 2     docosahexanoic acid/epa (FISH OIL ORAL) Take by mouth.       UNABLE TO FIND Med Name: Sliquid solution       No current facility-administered medications for this visit.      History   Smoking Status     Never Smoker   Smokeless Tobacco     Never Used       OBJECTICE: /74 (Patient Site: Left Arm)  Pulse 61  Temp 97.8  F (36.6  C) (Oral)   Resp 16  Ht 5' 8\" (1.727 m)  Wt 138 lb (62.6 kg)  SpO2 99%  Breastfeeding? No  BMI 20.98 kg/m2     No results found for this or any previous visit (from the past 24 hour(s)).     GEN-alert, appropriate, in no apparent distress   Exam deferred today given that she just had an exam which we were able to review the results from at [facility] and given that she will be seeing gynecology for a another exam as detailed above.    Ramo Law          "

## 2024-11-26 ENCOUNTER — TRANSCRIBE ORDERS (OUTPATIENT)
Dept: OTHER | Age: 79
End: 2024-11-26

## 2024-11-26 DIAGNOSIS — R42 DIZZINESS AND GIDDINESS: Primary | ICD-10-CM

## 2024-11-29 ENCOUNTER — THERAPY VISIT (OUTPATIENT)
Dept: PHYSICAL THERAPY | Facility: CLINIC | Age: 79
End: 2024-11-29
Payer: MEDICARE

## 2024-11-29 DIAGNOSIS — R42 DIZZINESS: Primary | ICD-10-CM

## 2024-11-29 PROCEDURE — 97162 PT EVAL MOD COMPLEX 30 MIN: CPT | Mod: GP

## 2024-11-29 PROCEDURE — 97112 NEUROMUSCULAR REEDUCATION: CPT | Mod: GP

## 2024-11-29 NOTE — PROGRESS NOTES
PHYSICAL THERAPY EVALUATION  Type of Visit: Evaluation        Fall Risk Screen:  Fall screen completed by: PT  Have you fallen 2 or more times in the past year?: No  Have you fallen and had an injury in the past year?: No  Is patient a fall risk?: No    Subjective         Presenting condition or subjective complaint: (Patient-Rptd) vertigo    Pt is an 78 y/o F, presenting to PT for evaluation of dizziness. Has had short episodes 2-3 times per year, for many years. She started having increased frequency of episodes approx 5 years ago. Then, her beginning of constant dizziness began about 1-year ago s/p fall on ice. She went to ED following the fall in Spring of 2024 - referred to vestibular PT at [facility] for constant spinning sensation. She never got rid of it completely (sounds like BPPV from patient description); but, it did improve to the point of 4-months of relief. She then went back a few months later for additional treatment due to the return of the dizziness; however, they did not have goggles and couldn't assess her.    Date of onset: 11/26/24      Relevant medical history:     BPPV  Potential concussion s/p fall on ice in Spring 2023.    Dates & types of surgery: (Patient-Rptd) knee replacement 7824      Prior diagnostic imaging/testing results:     None    Prior therapy history for the same diagnosis, illness or injury: (Patient-Rptd) Yes (Patient-Rptd) 2024PT    Prior Level of Function  Pt IND with all transfers, ambulation, and ADLs/IADLs at baseline.     Living Environment  Social support: (Patient-Rptd) With a significant other or spouse   Type of home: (Patient-Rptd) Multi-level   Stairs to enter the home:         Ramp: (Patient-Rptd) No   Stairs inside the home: (Patient-Rptd) Yes (Patient-Rptd) 3 Is there a railing: (Patient-Rptd) Yes     Help at home: (Patient-Rptd) None  Equipment owned:   Retired    Employment: (Patient-Rptd) No      Patient goals for therapy: (Patient-Rptd)  everything    Pain assessment: Pain denied in relation to her dizziness.     Objective      COGNITIVE STATUS EXAMINATION  Pt with intact cognition.     OBSERVATION: Pt ambulating well - no distress.     PALPATION: N/A     RANGE OF MOTION:  Cervical ROM WFL for vestibular testing. Demonstrating >45deg R/L rotation and >30deg extension.     STRENGTH: LE Strength WFL  UE Strength WFL     BED MOBILITY: Independent     TRANSFERS: Independent     WHEELCHAIR MOBILITY: N/A     GAIT:   Level of [place]: Independent  Assistive Device(s): None  Gait Deviations: WNL  Gait Distance: >50ft during PT eval  Stairs: Pt reporting use of HR.      BALANCE: Standing Balance (static):Good  Standing Balance (dynamic):Good  Will assess further at upcoming session if indicated.     SENSATION: UE Sensation WNL, LE Sensation WNL     COORDINATION: Deferred today.       VESTIBULAR EVALUATION  ADDITIONAL HISTORY:  Description of symptoms: (Patient-Rptd) Constant dizziness; Off balance; Light-headedness  Dizzy attacks:   Start: (Patient-Rptd) 1970   Last attack: (Patient-Rptd) ongoing  Difficulty hearing: (Patient-Rptd) Both ears  Noise in ears? (Patient-Rptd) No  - does wear hearing aids.   Alleviates symptoms: (Patient-Rptd) nothing  Activities that bring on symptoms: (Patient-Rptd) Bending over; Other       Pertinent visual history: Pt wears progressive lenses at baseline.  Pertinent history of current vestibular problem: Motion sickness, Prior concussion(s)  DHI: Total Score: (Patient-Rptd) 32/100 points    Cervicogenic Screen    Neck ROM Normal   Vertebral Artery Test Normal     Oculomotor Screen    Ocular ROM Normal   Smooth Pursuit Normal   Saccades Normal   VOR Normal - slight inc of dizziness.    VOR Cancellation Normal   Convergence Testing Normal; NPC = 5cm.         Infrared Goggle Exam Vestibular Suppressant in Last 24 Hours? No  Exam Completed With: Infrared goggles   Spontaneous Nystagmus Negative   Gaze Evoked Nystagmus Upward  gaze = Combination of upbeating & Horizontal R. No other significant gaze evoked nystagmus - but did have end-range with both right/left gaze.   Head Shake Horizontal Nystagmus Horizontal R, 4 beats. Pt asymptomatic.   Positional Testing See below.    Left Right   Bessie-Hallpike Horizontal L, constant - no fatigue. Pt asymptomatic. Horizontal R, constant - no fatigue. Pt asymptomatic.   [facility] Supine Roll Test Horizontal L, very faint nystagmus. Did not fatigue. Pt asymptomatic.  Horizontal R, constant - no fatigue. Pt asymptomatic.           Assessment & Plan   CLINICAL IMPRESSIONS  Medical Diagnosis: Dizziness and giddiness    Treatment Diagnosis: peripheral or central vestibular impairment; dizziness with head/body movement; imbalance   Impression/Assessment: Patient is a 79 year old female with dizziness and impaired functional mobility complaints.  PT suspecting peripheral vestibular involvement 2' positive Head Shake Test, and previous hx of BPPV. PT does not suspect she has an active BPPV, at this time. Could also have vestibular sensitivities remaining if she did sustain a concussion in Spring 2023. The following significant findings have been identified: Impaired balance, Impaired gait, Decreased activity tolerance, Instability, Dizziness, and Disequilibrium . These impairments interfere with their ability to perform household mobility and community mobility as compared to previous level of function. Will begin treatment with vestibular program for gaze stabilization and visual motion habituation.    Clinical Decision Making (Complexity):  Clinical Presentation: Evolving/Changing  Clinical Presentation Rationale: based on medical and personal factors listed in PT evaluation  Clinical Decision Making (Complexity): Moderate complexity    PLAN OF CARE  Treatment Interventions:  Interventions: Gait Training, Manual Therapy, Neuromuscular Re-education, Therapeutic Activity, Therapeutic Exercise, Self-Care/Home  Management    Long Term Goals     PT Goal 1  Goal Identifier: HEP  Goal Description: Pt will demonstrate IND with positional, gaze stabilization/adaptation, visual motion habituation, and static/dynamic balance exercises, to promote reduction of vestibular and dizziness symptoms for improved safety with functional mobility.  Goal Progress: initiated at eval  Target Date: 01/27/25  PT Goal 2  Goal Identifier: DHI  Goal Description: Pt will report 18pt decrease on DHI (MCID), for subjective reduction of dizziness symptoms with completion of daily activities.  Goal Progress: baseline: 32/100 points  Target Date: 01/27/25  PT Goal 3  Goal Identifier: FGA  Goal Description: Pt will score >22/30pts on FGA, to demonstrate improved dynamic balance and postural control with ambulation, and decreased risk for falling with functional mobility.  Goal Progress: assess at next session  Target Date: 01/27/25  PT Goal 4  Goal Identifier: DVA  Goal Description: Pt will demonstrate </=2 line loss on test of Dynamic Visual Acuity, demonstrating improvement in the function of the pt s VOR with dynamic head movement.  Goal Progress: assess at next session  Target Date: 01/27/25      Frequency of Treatment: 1x/week  Duration of Treatment: 60 days    Recommended Referrals to Other Professionals: Pt reporting she's had VNG Testing in the past. She's not interested in doing this again.   Education Assessment:   Learner/Method: Patient;Listening;Reading;Demonstration    Risks and benefits of evaluation/treatment have been explained.   Patient/Family/caregiver agrees with Plan of Care.     Evaluation Time:     PT Ramona, Moderate Complexity Minutes (62865): 20     Signing Clinician: Darlin Gomez, PT, DPT, [facility]                                                                                   OUTPATIENT PHYSICAL THERAPY      PLAN OF TREATMENT FOR OUTPATIENT REHABILITATION   Patient's Last Name,  First Name, M.I.  Annette Holder YOB: 1945   Provider's Name   [facility]   Medical Record No.  [id number]     Onset Date: 11/26/24  Start of Care Date: 11/29/24     Medical Diagnosis:  Dizziness and giddiness      PT Treatment Diagnosis:  peripheral or central vestibular impairment; dizziness with head/body movement; imbalance Plan of Treatment  Frequency/Duration: 1x/week/ 60 days    Certification date from 11/29/24 to 01/27/25         See note for plan of treatment details and functional goals     Darlin Gomez, PT, DPT, NCS                          I CERTIFY THE NEED FOR THESE SERVICES FURNISHED UNDER        THIS PLAN OF TREATMENT AND WHILE UNDER MY CARE     (Physician attestation of this document indicates review and certification of the therapy plan).              Referring Provider:  Justin Nolan MD    Initial Assessment  See Epic Evaluation- Start of Care Date: 11/29/24

## 2025-01-06 ENCOUNTER — TRANSCRIBE ORDERS (OUTPATIENT)
Dept: OTHER | Age: 80
End: 2025-01-06

## 2025-01-06 DIAGNOSIS — R42 VERTIGO: Primary | ICD-10-CM

## 2025-01-13 ENCOUNTER — THERAPY VISIT (OUTPATIENT)
Dept: PHYSICAL THERAPY | Facility: CLINIC | Age: 80
End: 2025-01-13
Payer: MEDICARE

## 2025-01-13 DIAGNOSIS — R42 DIZZINESS: Primary | ICD-10-CM

## 2025-01-13 PROCEDURE — 97112 NEUROMUSCULAR REEDUCATION: CPT | Mod: GP

## 2025-02-17 ENCOUNTER — THERAPY VISIT (OUTPATIENT)
Dept: PHYSICAL THERAPY | Facility: CLINIC | Age: 80
End: 2025-02-17
Payer: MEDICARE

## 2025-02-17 DIAGNOSIS — R42 DIZZINESS: Primary | ICD-10-CM

## 2025-02-17 PROCEDURE — 97112 NEUROMUSCULAR REEDUCATION: CPT | Mod: GP

## 2025-02-17 NOTE — PROGRESS NOTES
02/17/25 0500   Appointment Info   Signing clinician's name / credentials Darlin Gomez, PT, DPT, NCS   Total/Authorized Visits 4/4 in PT POC   Visits Used 4   Medical Diagnosis Dizziness and giddiness   PT Tx Diagnosis peripheral or central vestibular impairment; dizziness with head/body movement; imbalance   Precautions/Limitations peripheral or central vestibular impairment; dizziness with head/body movement; imbalance   Other pertinent information Pt with hx of dizziness 2' BPPV (pt described sx in a pattern that fits diagonsis - a few months ago it resolved), potential concussion s/p fall on ice/head strike in 03/2023, and prolonged history of other dizziness (>10 years).   Quick Adds Certification   Progress Note/Certification   Start of Care Date 11/29/24   Onset of illness/injury or Date of Surgery 11/26/24   Therapy Frequency 1x/month   Predicted Duration 60 days   Certification date from 01/27/25   Certification date to 02/17/25   Progress Note Completed Date 12/02/24   GOALS   PT Goals 2;3;4   PT Goal 1   Goal Identifier MET: HEP   Goal Description Pt will demonstrate IND with positional, gaze stabilization/adaptation, visual motion habituation, and static/dynamic balance exercises, to promote reduction of vestibular and dizziness symptoms for improved safety with functional mobility.   Goal Progress 12/13/2024: Pt reporting no change in symptoms with HEP.   Target Date 02/17/25   Date Met 02/17/25   PT Goal 2   Goal Identifier IMPROVING/SUBJECTIVELY MET: DHI   Goal Description Pt will report 18pt decrease on DHI (MCID), for subjective reduction of dizziness symptoms with completion of daily activities.   Goal Progress 2/17/2025: Pt reporting significant improvement of her dizziness symptoms.   Target Date 02/17/25   Date Met 02/17/25   PT Goal 3   Goal Identifier MET: FGA   Goal Description Pt will score >22/30pts on FGA, to demonstrate improved dynamic balance and postural control with ambulation,  and decreased risk for falling with functional mobility.   Goal Progress 2/17/2025: Pt with 30/30 points on FGA. Not at risk for falling.   Target Date 02/17/25   Date Met 02/17/25   PT Goal 4   Goal Identifier MET: DVA   Goal Description Pt will demonstrate </=2 line loss on test of Dynamic Visual Acuity, demonstrating improvement in the function of the pt s VOR with dynamic head movement.   Goal Progress 12/13/2024: Pt with 0 line loss on the DVA. Static = 8, Dynamic = 8. Pt with slight muscle guarding. Reporting no dizziness.   Target Date 01/27/25   Date Met 12/13/24   Treatment Interventions (PT)   Interventions Neuromuscular Re-education   Neuromuscular Re-education   Neuromuscular re-ed of mvmt, balance, coord, kinesthetic sense, posture, proprioception minutes (43525) 30   Neuromuscular Re-education Neuro Re-ed 2;Neuro Re-ed 3   Neuro Re-ed 1 Completed FGA; see note attached. Not at risk for falling and has strong balance control.   Neuro Re-ed 2 Performed program of VORx1 H/V for 30s x6 in quiet vs busy background and/or on firm vs. foam surface and/or with single target vs. letter chart (the most symptomatic with busy background on firm surface; not challenged by foam surface), VOR Cx H for 10x2 on firm vs. foam surface (most symptomatic on firm surface 2' inc speed of movement), split stance R/L with leading LE on 6-in block with no UE support and EC for 30s x2 (added to HEP for balance challenge; SUPV needed; able to catch balance with UEs and IND), and ambulation down the hallway with slowed marching for improved SLS control (harder time when on L LE only; added to HEP). Pt in support of balance additions and continuation of her VORx1 and VOR Cx. Plan to follow-up with pt after she's seen by Neurology in 05/2025 to ensure symptoms are remaining improved. Will re-eval at that time.   Neuro Re-ed 3 Further pt edu for continuation of daily aerobic exercise for improved vestibular function.   Skilled  Intervention Facilitated vestibular rehabilitation, static/dynamic balance, and postural stability training with cues and assistance as needed and progressive increase of difficulty as needed to promote improved safety and independence in the home and community, education provided for home exercise program understanding.   Infrared Goggle Exam or Frenzel Lense Exam   Vestibular Suppressant in Last 24 Hours? No   Exam completed with Infrared Goggles   Spontaneous Nystagmus Downbeating   Gaze Evoked Nystagmus Horizontal R;Horizontal L;Downbeating   Gaze Evoked Nystagmus comments Pt with downbeating with upward gaze, right beating with right gaze, left beating with left gaze, and downbeating with downward gaze. Nystagmus presenting like a flutter.   Head Shake Horizontal Nystagmus Negative   Eddie-Hallpike (Right) Other  (Deferred 2' finding of continued downbeating nystagmus that did not seem peripheral in origin with SHH.)   Eddie-Hallpike (Left) Other  (Deferred 2' finding of continued downbeating nystagmus that did not seem peripheral in origin with SHH.)   [facility] Supine Roll Test (Right) Downbeating;Horizontal L   [facility] Supine Roll Test (Right) Comments Did not fatigue; reporting increased lightheadedness.   [facility] (Left) Downbeating   [facility] Test (Left) Comments  Did not fatigue; reporting increased lightheadedness.   Supine Head-Hanging Test Downbeating   Supine Head-Hanging Test Comments Increased intensity compared to other positions - worsening symptoms.   Other Infrared Goggle Exam or Frenzel Lense Exam Comments Do not suspect BPPV.   Education   Learner/Method Patient;Listening;Reading;Demonstration   Plan   Home program HEP - See tx detail above.   Plan for next session Discharge; pt to f/u with PT in 05/2025.   Total Session Time   Timed Code Treatment Minutes 30   Total Treatment Time (sum of timed and untimed services) 30         [facility]                                                                                    OUTPATIENT PHYSICAL THERAPY    PLAN OF TREATMENT FOR OUTPATIENT REHABILITATION   Patient's Last Name, First Name, Annette Roy YOB: 1945   Provider's Name   [facility]   Medical Record No.  [id number]     Onset Date: 11/26/24  Start of Care Date: 11/29/24     Medical Diagnosis:  Dizziness and giddiness      PT Treatment Diagnosis:  peripheral or central vestibular impairment; dizziness with head/body movement; imbalance Plan of Treatment  Frequency/Duration: 1x/month/ 60 days    Certification date from 01/27/25 to 02/17/25         See note for plan of treatment details and functional goals     Darlin Gomez, PT, DPT, NCS                           I CERTIFY THE NEED FOR THESE SERVICES FURNISHED UNDER        THIS PLAN OF TREATMENT AND WHILE UNDER MY CARE     (Physician attestation of this document indicates review and certification of the therapy plan).              Referring Provider:  Justin Nolan MD    Initial Assessment  See Epic Evaluation- Start of Care Date: 11/29/24

## 2025-02-17 NOTE — PROGRESS NOTES
02/17/25 1200   Signing Clinician's Name / Credentials   Signing clinician's name / credentials Darlin Gomez, PT, DPT, NCS   Functional Gait Assessment (DONTA Christian, ERASMO Turner., et al. (2004))   1. GAIT LEVEL SURFACE 3   2. CHANGE IN GAIT SPEED 3   3. GAIT WITH HORIZONTAL HEAD TURNS 3   4. GAIT WITH VERTICAL HEAD TURNS 3   5. GAIT AND PIVOT TURN 3   6. STEP OVER OBSTACLE 3   7. GAIT WITH NARROW BASE OF SUPPORT 3   8. GAIT WITH EYES CLOSED 3   9. AMBULATING BACKWARDS 3   10. STEPS 3   Total Functional Gait Assessment Score   TOTAL SCORE: (MAXIMUM SCORE 30) 30       Functional Gait Assessment (FGA): The FGA assesses postural stability during various walking tasks.   Gait assistive device used: none      Patient Score: 30/30 - Pt not at risk for falling.    Scores of <22/30 have been correlated with predicting falls in community-dwelling older adults according to Gino & Chay 2010.   Scores of <18/30 have been correlated with increased risk for falls in patients with Parkinsons Disease according to Gavin Darnell, Beard et al 2014.  Minimal Detectable Change for patients with acute/chronic stroke = 4.2 according to Teja & Travisschjerardo 2009  Minimal Detectable Change for patients with vestibular disorder = 8 according to Gino & Chay 2010     Assessment (rationale for performing, application to patient s function & care plan): s/p vestibular impairment with demonstrated balance impairments, repeated as has demonstrated improved gait, balance and overall functional mobility since initial assessment.

## 2025-02-17 NOTE — PROGRESS NOTES
02/17/25 0500   Appointment Info   Signing clinician's name / credentials Darlin Gomez, PT, DPT, NCS   Total/Authorized Visits 4/4 in PT POC   Visits Used 4   Medical Diagnosis Dizziness and giddiness   PT Tx Diagnosis peripheral or central vestibular impairment; dizziness with head/body movement; imbalance   Precautions/Limitations peripheral or central vestibular impairment; dizziness with head/body movement; imbalance   Other pertinent information Pt with hx of dizziness 2' BPPV (pt described sx in a pattern that fits diagonsis - a few months ago it resolved), potential concussion s/p fall on ice/head strike in 03/2023, and prolonged history of other dizziness (>10 years).   Quick Adds Certification   Progress Note/Certification   Start of Care Date 11/29/24   Onset of illness/injury or Date of Surgery 11/26/24   Therapy Frequency 1x/month   Predicted Duration 60 days   Certification date from 01/27/25   Certification date to 02/17/25   Progress Note Completed Date 12/02/24   GOALS   PT Goals 2;3;4   PT Goal 1   Goal Identifier MET: HEP   Goal Description Pt will demonstrate IND with positional, gaze stabilization/adaptation, visual motion habituation, and static/dynamic balance exercises, to promote reduction of vestibular and dizziness symptoms for improved safety with functional mobility.   Goal Progress 12/13/2024: Pt reporting no change in symptoms with HEP.   Target Date 02/17/25   Date Met 02/17/25   PT Goal 2   Goal Identifier IMPROVING/SUBJECTIVELY MET: DHI   Goal Description Pt will report 18pt decrease on DHI (MCID), for subjective reduction of dizziness symptoms with completion of daily activities.   Goal Progress 2/17/2025: Pt reporting significant improvement of her dizziness symptoms.   Target Date 02/17/25   Date Met 02/17/25   PT Goal 3   Goal Identifier MET: FGA   Goal Description Pt will score >22/30pts on FGA, to demonstrate improved dynamic balance and postural control with ambulation,  and decreased risk for falling with functional mobility.   Goal Progress 2/17/2025: Pt with 30/30 points on FGA. Not at risk for falling.   Target Date 02/17/25   Date Met 02/17/25   PT Goal 4   Goal Identifier MET: DVA   Goal Description Pt will demonstrate </=2 line loss on test of Dynamic Visual Acuity, demonstrating improvement in the function of the pt s VOR with dynamic head movement.   Goal Progress 12/13/2024: Pt with 0 line loss on the DVA. Static = 8, Dynamic = 8. Pt with slight muscle guarding. Reporting no dizziness.   Target Date 01/27/25   Date Met 12/13/24   Treatment Interventions (PT)   Interventions Neuromuscular Re-education   Neuromuscular Re-education   Neuromuscular re-ed of mvmt, balance, coord, kinesthetic sense, posture, proprioception minutes (59091) 30   Neuromuscular Re-education Neuro Re-ed 2;Neuro Re-ed 3   Neuro Re-ed 1 Completed FGA; see note attached. Not at risk for falling and has strong balance control.   Neuro Re-ed 2 Performed program of VORx1 H/V for 30s x6 in quiet vs busy background and/or on firm vs. foam surface and/or with single target vs. letter chart (the most symptomatic with busy background on firm surface; not challenged by foam surface), VOR Cx H for 10x2 on firm vs. foam surface (most symptomatic on firm surface 2' inc speed of movement), split stance R/L with leading LE on 6-in block with no UE support and EC for 30s x2 (added to HEP for balance challenge; SUPV needed; able to catch balance with UEs and IND), and ambulation down the hallway with slowed marching for improved SLS control (harder time when on L LE only; added to HEP). Pt in support of balance additions and continuation of her VORx1 and VOR Cx. Plan to follow-up with pt after she's seen by Neurology in 05/2025 to ensure symptoms are remaining improved. Will re-eval at that time.   Neuro Re-ed 3 Further pt edu for continuation of daily aerobic exercise for improved vestibular function.   Skilled  Intervention Facilitated vestibular rehabilitation, static/dynamic balance, and postural stability training with cues and assistance as needed and progressive increase of difficulty as needed to promote improved safety and independence in the home and community, education provided for home exercise program understanding.   Infrared Goggle Exam or Frenzel Lense Exam   Vestibular Suppressant in Last 24 Hours? No   Exam completed with Infrared Goggles   Spontaneous Nystagmus Downbeating   Gaze Evoked Nystagmus Horizontal R;Horizontal L;Downbeating   Gaze Evoked Nystagmus comments Pt with downbeating with upward gaze, right beating with right gaze, left beating with left gaze, and downbeating with downward gaze. Nystagmus presenting like a flutter.   Head Shake Horizontal Nystagmus Negative   Eddie-Hallpike (Right) Other  (Deferred 2' finding of continued downbeating nystagmus that did not seem peripheral in origin with SHH.)   Eddie-Hallpike (Left) Other  (Deferred 2' finding of continued downbeating nystagmus that did not seem peripheral in origin with SHH.)   [facility] Supine Roll Test (Right) Downbeating;Horizontal L   [facility] Supine Roll Test (Right) Comments Did not fatigue; reporting increased lightheadedness.   [facility] Sup[facility] Roll Test (Left) Downbeating   [facility] Supine Roll Test (Left) Comments  Did not fatigue; reporting increased lightheadedness.   Supine Head-Hanging Test Downbeating   Supine Head-Hanging Test Comments Increased intensity compared to other positions - worsening symptoms.   Other Infrared Goggle Exam or Frenzel Lense Exam Comments Do not suspect BPPV.   Education   Learner/Method Patient;Listening;Reading;Demonstration   Plan   Home program HEP - See tx detail above.   Plan for next session Discharge; pt to f/u with PT in 05/2025.   Total Session Time   Timed Code Treatment Minutes 30   Total Treatment Time (sum of timed and untimed services) 30       DISCHARGE  Reason for Discharge: Patient has met all  goals.    Equipment Issued: N/A    Discharge Plan: Patient to continue home program until follow-up with Neurology. She can return to PT if her symptoms increase in the time being; however, they should remain in control with home exercise program.    Referring Provider:  Justin Nolan MD

## 2025-03-21 ENCOUNTER — THERAPY VISIT (OUTPATIENT)
Dept: PHYSICAL THERAPY | Facility: CLINIC | Age: 80
End: 2025-03-21
Payer: MEDICARE

## 2025-03-21 DIAGNOSIS — R42 DIZZINESS: Primary | ICD-10-CM

## 2025-03-21 PROCEDURE — 95992 CANALITH REPOSITIONING PROC: CPT | Mod: GP

## 2025-03-21 PROCEDURE — 97162 PT EVAL MOD COMPLEX 30 MIN: CPT | Mod: GP

## 2025-03-23 NOTE — PROGRESS NOTES
"PHYSICAL THERAPY EVALUATION  Type of Visit: Evaluation              Subjective         Presenting condition or subjective complaint:      Pt is an 79 y/o F, presenting to PT for evaluation of dizziness. Pt reporting she awoke one day this week with room-spinning symptoms that are persistent t/o the day. She arrives today with her . They are planning to leave for a cruise in the next two weeks, and she'd like a solution to dizziness before departure.    From previous PT eval  by this PT on 11/29/2024:   \"Pt is an 78 y/o F, presenting to PT for evaluation of dizziness. Has had short episodes 2-3 times per year, for many years. She started having increased frequency of episodes approx 5 years ago. Then, her beginning of constant dizziness began about 1-year ago s/p fall on ice. She went to ED following the fall in Spring of 2024 - referred to vestibular PT at [facility] for constant spinning sensation. She never got rid of it completely (sounds like BPPV from patient description); but, it did improve to the point of 4-months of relief. She then went back a few months later for additional treatment due to the return of the dizziness; however, they did not have goggles and couldn't assess her.\"     Date of onset: 03/21/25      Relevant medical history:     BPPV  Potential concussion s/p fall on ice in Spring 2023.    Dates & types of surgery:    TKA    Prior diagnostic imaging/testing results:     Yes, MRI WFL.    Prior therapy history for the same diagnosis, illness or injury:      Yes, PT in [id number] with this PT. PT had suspected central dizziness 2; persistent downbeating nystagmus with any cervical extension - was not following BPPV pattern.   Prior Level of Function  Pt IND with all transfers, ambulation, and ADLs/IADLs at baseline.      Living Environment  Social support: (Patient-Rptd) With a significant other or spouse   Type of home: (Patient-Rptd) Multi-level   Stairs to enter the home:         Ramp: " (Patient-Rptd) No   Stairs inside the home: (Patient-Rptd) Yes (Patient-Rptd) 3 Is there a railing: (Patient-Rptd) Yes     Help at home: (Patient-Rptd) None  Equipment owned:   Retired     Employment: (Patient-Rptd) No       Patient goals for therapy: (Patient-Rptd) everything     Pain assessment: Pain denied in relation to her dizziness.           Objective  COGNITIVE STATUS EXAMINATION  Pt with intact cognition.     OBSERVATION: Pt ambulating well - no distress.     PALPATION: N/A     RANGE OF MOTION:  Cervical ROM WFL for vestibular testing. Demonstrating >45deg R/L rotation and >30deg extension.     STRENGTH: LE Strength WFL  UE Strength WFL     BED MOBILITY: Independent     TRANSFERS: Independent     WHEELCHAIR MOBILITY: N/A     GAIT:   Level of [place]: Independent  Assistive Device(s): None  Gait Deviations: WNL  Gait Distance: >50ft during PT eval  Stairs: Pt reporting use of HR.      BALANCE: Standing Balance (static):Good  Standing Balance (dynamic):Good  Will assess further at upcoming session if indicated.     SENSATION: UE Sensation WNL, LE Sensation WNL     COORDINATION: Deferred today.     Objective         VESTIBULAR EVALUATION  ADDITIONAL HISTORY:  Description of symptoms:  See subjective above.   Difficulty hearing:  No  Noise in ears?    Yes, constant ringing.  Activities that bring on symptoms:  Rolling in bed.     Pertinent visual history: Glasses  Pertinent history of current vestibular problem: Prior concussion(s), BPPV; Central Dizziness    Cervicogenic Screen    Neck ROM Normal   Vertebral Artery Test Normal and no downbeating nsytagmus with cervical ext today.     Oculomotor Screen    Ocular ROM Normal   Smooth Pursuit Normal   Saccades Normal   VOR Normal   VOR Cancellation Normal   Convergence Testing Normal        Infrared Goggle Exam Vestibular Suppressant in Last 24 Hours? No  Exam Completed With: Infrared goggles   Spontaneous Nystagmus Negative   Gaze Evoked Nystagmus Pt with  Horizontal L with L gaze, and downbeating with downward gaze - these are central signs.   Head Shake Horizontal Nystagmus Negative   Positional Testing See below.    Left Right   Eddie-Hallpike Negative Horizontal L, did not fatigue. Pt asymptomatic.    [facility] Supine Roll Test Horizontal R, did not fatigue. Pt reporting moderate symptoms and onset of nausea. Horizontal L, did not fatigue. Pt reporting mild symptoms.         BPPV Canal(s): R Horizontal  BPPV Type: Cupulolithasis    Dynamic Visual Acuity (DVA)    Static Acuity (LogMar)    Horizontal Head Movement at 1 Hz (LogMar)    Horizontal Head Movement at 2 Hz (LogMar)             Assessment & Plan   CLINICAL IMPRESSIONS  Medical Diagnosis: BPPV; dizziness; imbalance    Treatment Diagnosis: BPPV; dizziness; imbalance   Impression/Assessment: Patient is a 80 year old female with dizziness and imbalance complaints.  The following significant findings have been identified: Decreased ROM/flexibility, Impaired balance, Impaired gait, Decreased activity tolerance, Instability, Dizziness, and Disequilibrium . These impairments interfere with their ability to perform household mobility and community mobility as compared to previous level of function.     Clinical Decision Making (Complexity):  Clinical Presentation: Evolving/Changing  Clinical Presentation Rationale: based on medical and personal factors listed in PT evaluation  Clinical Decision Making (Complexity): Moderate complexity    PLAN OF CARE  Treatment Interventions:  Interventions: Gait Training, Manual Therapy, Neuromuscular Re-education, Therapeutic Activity, Therapeutic Exercise, Self-Care/Home Management, Canalith Repositioning    Long Term Goals     PT Goal 1  Goal Identifier: HEP  Goal Description: Pt will demonstrate IND with BPPV repositioning techniques to promote reduction of vestibular symptoms and improve safety with functional mobility, if symptoms return.  Target Date: 04/19/25  PT Goal 2  Goal  Identifier: Subjective Dizziness  Goal Description: Pt will report </=0-2/10 dizziness with all body movement for subjective reduction of dizziness symptoms with completion of daily activities.  Target Date: 04/19/25  PT Goal 3  Goal Identifier: Positional Testing  Goal Description: Pt will complete BPPV positional testing without observed nystagmus and/or increase of dizziness.  Target Date: 05/21/25      Frequency of Treatment: x2/wk with reduction as appropriate  Duration of Treatment: 30 days    Recommended Referrals to Other Professionals:  None  Education Assessment:   Learner/Method: Patient;Listening;Reading;Demonstration;Pictures/Video    Risks and benefits of evaluation/treatment have been explained.   Patient/Family/caregiver agrees with Plan of Care.     Evaluation Time:     PT Eval, Moderate Complexity Minutes (55345): 15     Signing Clinician: Darlin Gomez PT, DPT, NCS          [facility]                                                                                   OUTPATIENT PHYSICAL THERAPY      PLAN OF TREATMENT FOR OUTPATIENT REHABILITATION   Patient's Last Name, First Name, Annette Roy YOB: 1945   Provider's Name   [facility]   Medical Record No.  [id number]     Onset Date: 03/21/25  Start of Care Date: 03/21/25     Medical Diagnosis:  BPPV; dizziness; imbalance      PT Treatment Diagnosis:  BPPV; dizziness; imbalance Plan of Treatment  Frequency/Duration: x2/wk with reduction as appropriate/ 30 days    Certification date from 03/21/25 to 04/19/25         See note for plan of treatment details and functional goals   Darlin Gomez PT, DPT, NCS                           I CERTIFY THE NEED FOR THESE SERVICES FURNISHED UNDER        THIS PLAN OF TREATMENT AND WHILE UNDER MY CARE     (Physician attestation of this document indicates review and certification of the therapy plan).              Referring  Provider:  Justin Nolan MD    Initial Assessment  See Epic Evaluation- Start of Care Date: 03/21/25

## 2025-04-01 ENCOUNTER — THERAPY VISIT (OUTPATIENT)
Dept: PHYSICAL THERAPY | Facility: CLINIC | Age: 80
End: 2025-04-01
Payer: MEDICARE

## 2025-04-01 DIAGNOSIS — R42 DIZZINESS: Primary | ICD-10-CM

## 2025-04-01 PROCEDURE — 97112 NEUROMUSCULAR REEDUCATION: CPT | Mod: GP

## 2025-04-24 ENCOUNTER — OFFICE VISIT (OUTPATIENT)
Dept: PEDIATRICS | Facility: CLINIC | Age: 80
End: 2025-04-24
Payer: MEDICARE

## 2025-04-24 ENCOUNTER — PATIENT OUTREACH (OUTPATIENT)
Dept: ONCOLOGY | Facility: CLINIC | Age: 80
End: 2025-04-24

## 2025-04-24 VITALS
SYSTOLIC BLOOD PRESSURE: 132 MMHG | HEIGHT: 67 IN | DIASTOLIC BLOOD PRESSURE: 80 MMHG | OXYGEN SATURATION: 97 % | WEIGHT: 134.5 LBS | BODY MASS INDEX: 21.11 KG/M2 | HEART RATE: 74 BPM | TEMPERATURE: 97.9 F | RESPIRATION RATE: 16 BRPM

## 2025-04-24 DIAGNOSIS — Z12.31 ENCOUNTER FOR SCREENING MAMMOGRAM FOR BREAST CANCER: ICD-10-CM

## 2025-04-24 DIAGNOSIS — Z80.3 FAMILY HISTORY OF MALIGNANT NEOPLASM OF BREAST: ICD-10-CM

## 2025-04-24 DIAGNOSIS — R41.3 MEMORY LOSS: ICD-10-CM

## 2025-04-24 DIAGNOSIS — M81.0 AGE-RELATED OSTEOPOROSIS WITHOUT CURRENT PATHOLOGICAL FRACTURE: ICD-10-CM

## 2025-04-24 DIAGNOSIS — Z00.00 ENCOUNTER FOR MEDICARE ANNUAL WELLNESS EXAM: Primary | ICD-10-CM

## 2025-04-24 DIAGNOSIS — E78.2 MIXED HYPERLIPIDEMIA: ICD-10-CM

## 2025-04-24 PROBLEM — M19.90 OSTEOARTHROSIS: Status: ACTIVE | Noted: 2018-04-11

## 2025-04-24 PROBLEM — I70.0 ATHEROSCLEROSIS OF AORTA: Status: ACTIVE | Noted: 2023-11-14

## 2025-04-24 PROBLEM — E78.5 HLD (HYPERLIPIDEMIA): Status: ACTIVE | Noted: 2023-11-14

## 2025-04-24 PROBLEM — Z97.4 WEARS HEARING AID IN BOTH EARS: Status: ACTIVE | Noted: 2024-12-19

## 2025-04-24 PROBLEM — M35.3 PMR (POLYMYALGIA RHEUMATICA): Status: ACTIVE | Noted: 2023-11-14

## 2025-04-24 PROBLEM — E21.0 PRIMARY HYPERPARATHYROIDISM: Status: ACTIVE | Noted: 2018-04-11

## 2025-04-24 PROBLEM — Z96.651 S/P TOTAL KNEE ARTHROPLASTY, RIGHT: Status: RESOLVED | Noted: 2024-08-28 | Resolved: 2025-04-24

## 2025-04-24 PROBLEM — I07.9 DISEASE OF TRICUSPID VALVE: Status: ACTIVE | Noted: 2025-04-24

## 2025-04-24 PROBLEM — I25.10 CORONARY ATHEROSCLEROSIS: Status: ACTIVE | Noted: 2023-11-14

## 2025-04-24 PROBLEM — N90.4 LICHEN SCLEROSUS OF FEMALE GENITALIA: Status: ACTIVE | Noted: 2018-07-05

## 2025-04-24 LAB
ALBUMIN SERPL BCG-MCNC: 4.4 G/DL (ref 3.5–5.2)
ALP SERPL-CCNC: 75 U/L (ref 40–150)
ALT SERPL W P-5'-P-CCNC: 16 U/L (ref 0–50)
ANION GAP SERPL CALCULATED.3IONS-SCNC: 9 MMOL/L (ref 7–15)
AST SERPL W P-5'-P-CCNC: 45 U/L (ref 0–45)
BILIRUB SERPL-MCNC: 0.6 MG/DL
BUN SERPL-MCNC: 15 MG/DL (ref 8–23)
CALCIUM SERPL-MCNC: 9.8 MG/DL (ref 8.8–10.4)
CHLORIDE SERPL-SCNC: 107 MMOL/L (ref 98–107)
CHOLEST SERPL-MCNC: 200 MG/DL
CREAT SERPL-MCNC: 0.87 MG/DL (ref 0.51–0.95)
EGFRCR SERPLBLD CKD-EPI 2021: 67 ML/MIN/1.73M2
FASTING STATUS PATIENT QL REPORTED: NO
FASTING STATUS PATIENT QL REPORTED: NO
GLUCOSE SERPL-MCNC: 87 MG/DL (ref 70–99)
HCO3 SERPL-SCNC: 28 MMOL/L (ref 22–29)
HDLC SERPL-MCNC: 100 MG/DL
LDLC SERPL CALC-MCNC: 91 MG/DL
NONHDLC SERPL-MCNC: 100 MG/DL
POTASSIUM SERPL-SCNC: 4.4 MMOL/L (ref 3.4–5.3)
PROT SERPL-MCNC: 7 G/DL (ref 6.4–8.3)
SODIUM SERPL-SCNC: 144 MMOL/L (ref 135–145)
TRIGL SERPL-MCNC: 44 MG/DL

## 2025-04-24 RX ORDER — MECLIZINE HCL 12.5 MG 12.5 MG/1
12.5 TABLET ORAL 3 TIMES DAILY PRN
COMMUNITY
Start: 2024-12-19

## 2025-04-24 RX ORDER — PRAVASTATIN SODIUM 20 MG
20 TABLET ORAL AT BEDTIME
Qty: 90 TABLET | Refills: 3 | Status: SHIPPED | OUTPATIENT
Start: 2025-04-24

## 2025-04-24 RX ORDER — MULTIVITAMIN
1 TABLET ORAL DAILY
COMMUNITY
Start: 2024-07-16

## 2025-04-24 RX ORDER — PRAVASTATIN SODIUM 20 MG
20 TABLET ORAL AT BEDTIME
COMMUNITY
End: 2025-04-24

## 2025-04-24 RX ORDER — ASPIRIN 81 MG/1
81 TABLET, CHEWABLE ORAL DAILY
COMMUNITY

## 2025-04-24 SDOH — HEALTH STABILITY: PHYSICAL HEALTH: ON AVERAGE, HOW MANY DAYS PER WEEK DO YOU ENGAGE IN MODERATE TO STRENUOUS EXERCISE (LIKE A BRISK WALK)?: 5 DAYS

## 2025-04-24 ASSESSMENT — SOCIAL DETERMINANTS OF HEALTH (SDOH): HOW OFTEN DO YOU GET TOGETHER WITH FRIENDS OR RELATIVES?: ONCE A WEEK

## 2025-04-24 ASSESSMENT — PAIN SCALES - GENERAL: PAINLEVEL_OUTOF10: NO PAIN (0)

## 2025-04-24 NOTE — PROGRESS NOTES
Preventive Care Visit  [facility] MELISSA Damon MD, Internal Medicine  Apr 24, 2025      Assessment & Plan     (Z00.00) Encounter for Medicare annual wellness exam  (primary encounter diagnosis)  Comment: up to date on screening.   Plan: Lipid panel reflex to direct LDL Fasting,         Comprehensive metabolic panel (BMP + Alb, Alk         Phos, ALT, AST, Total. Bili, TP)            (Z80.3) Family history of malignant neoplasm of breast  Comment: has very strong family history, was part of a trial and took raloxifene or tamoxifen for 5 years through [facility].  Would like to consult with oncology about screening recommendation (mammogram vs MRI?)  Plan: Adult Oncology/Hematology  Referral            (Z12.31) Encounter for screening mammogram for breast cancer  Comment: routine  Plan: MA Screening Bilateral w/ Andi            (R41.3) Memory loss  Comment: noticing this in multiple situations. Would like to pursue evaluation with OT  Plan: Occupational Therapy  Referral            (E78.2) Mixed hyperlipidemia  Comment: due for refill.   Plan: pravastatin (PRAVACHOL) 20 MG tablet          (M81.0) Age-related osteoporosis without current pathological fracture.   Comment: followed by bone health specialist in [place]. Due for dexa again next year.     The longitudinal plan of care for the diagnosis(es)/condition(s) as documented were addressed during this visit. Due to the added complexity in care, I will continue to support Annette in the subsequent management and with ongoing continuity of care.             Counseling  Appropriate preventive services were addressed with this patient via screening, questionnaire, or discussion as appropriate for fall prevention, nutrition, physical activity, Tobacco-use cessation, social engagement, weight loss and cognition.  Checklist reviewing preventive services available has been given to the patient.  Reviewed patient's diet, addressing  "concerns and/or questions.   The patient was provided with written information regarding signs of hearing loss.           Chris Bryant is a 80 year old, presenting for the following:  Medicare Visit        4/24/2025     9:25 AM   Additional Questions   Roomed by Domi   Accompanied by jason         4/24/2025     9:25 AM   Patient Reported Additional Medications   Patient reports taking the following new medications jason BELLAMY     Annette is here for a preventive health visit.  Overall, she is doing well with the following concerns:    Memory issues in the past few years. Came at the same time as the vertigo. Sometimes she just can't remember things, but this is abnormal for her. She has had a great memory.   Vertigo -- improving with PT. Much better.   Had right knee replacement last July.   Osteopenia - most recent dexa at [facility]. Had seen a \"bone doctor\" in the past, has had two injection, has taken alendronate in the past also. Due for dexa again 12/2026 and visit with bone doctor  Family history of breast cancer. Mother and maternal aunts (2) had breast cancer. Both sisters had bilateral mastectomy. Used to follow with the breast center at [place]. Was in a study raloxifene/tamoxifen and took one (age 55/60 for 5 years). No side effects.   Continues to get mammograms annually      Advance Care Planning    Patient states has Health Care Directive and will send to [employer].        4/24/2025   General Health   How would you rate your overall physical health? Excellent   Feel stress (tense, anxious, or unable to sleep) Only a little   (!) STRESS CONCERN      4/24/2025   Nutrition   Diet: Regular (no restrictions)         4/24/2025   Exercise   Days per week of moderate/strenous exercise 5 days         4/24/2025   Social Factors   Frequency of gathering with friends or relatives Once a week   Worry food won't last until get money to buy more No   Food not last or not have enough money for food? No "   Do you have housing? (Housing is defined as stable permanent housing and does not include staying outside in a car, in a tent, in an abandoned building, in an overnight shelter, or couch-surfing.) Yes   Are you worried about losing your housing? No   Lack of transportation? No   Unable to get utilities (heat,electricity)? No         4/24/2025   Fall Risk   Fallen 2 or more times in the past year? No    No   Trouble with walking or balance? No    Yes       Multiple values from one day are sorted in reverse-chronological order          4/24/2025   Activities of Daily Living- Home Safety   Needs help with the following daily activites None of the above   Safety concerns in the home None of the above         4/24/2025   Dental   Dentist two times every year? Yes         4/24/2025   Hearing Screening   Hearing concerns? (!) I NEED TO ASK PEOPLE TO SPEAK UP OR REPEAT THEMSELVES.    (!) IT'S HARD TO FOLLOW A CONVERSATION IN A NOISY RESTAURANT OR CROWDED ROOM.       Multiple values from one day are sorted in reverse-chronological order         4/24/2025   Driving Risk Screening   Patient/family members have concerns about driving No         4/24/2025   General Alertness/Fatigue Screening   Have you been more tired than usual lately? No         4/24/2025   Urinary Incontinence Screening   Bothered by leaking urine in past 6 months No         Today's PHQ-2 Score:       4/24/2025     9:13 AM   PHQ-2 ( 1999 [employer])   Q1: Little interest or pleasure in doing things 0   Q2: Feeling down, depressed or hopeless 0   PHQ-2 Score 0    Q1: Little interest or pleasure in doing things Not at all   Q2: Feeling down, depressed or hopeless Not at all   PHQ-2 Score 0       Patient-reported           4/24/2025   Substance Use   Alcohol more than 3/day or more than 7/wk No   Do you have a current opioid prescription? No   How severe/bad is pain from 1 to 10? 1/10   Do you use any other substances recreationally? No     Social History  "    Tobacco Use    Smoking status: Never    Smokeless tobacco: Never   Vaping Use    Vaping status: Never Used   Substance Use Topics    Alcohol use: Yes    Drug use: No                        Reviewed and updated as needed this visit by Provider   Tobacco  Allergies  Meds  Problems  Med Hx  Surg Hx  Fam Hx              Current providers sharing in care for this patient include:  Patient Care Team:  Jamila Damon MD as PCP - General (Internal Medicine)    The following health maintenance items are reviewed in Epic and correct as of today:  Health Maintenance   Topic Date Due    LIPID  Never done    ANNUAL REVIEW OF HM ORDERS  Never done    COVID-19 Vaccine (8 - 2024-25 season) 03/23/2025    MEDICARE ANNUAL WELLNESS VISIT  04/24/2026    FALL RISK ASSESSMENT  04/24/2026    ADVANCE CARE PLANNING  04/24/2030    DTAP/TDAP/TD IMMUNIZATION (5 - Td or Tdap) 05/23/2032    DEXA  01/19/2039    PHQ-2 (once per calendar year)  Completed    INFLUENZA VACCINE  Completed    Pneumococcal Vaccine: 50+ Years  Completed    ZOSTER IMMUNIZATION  Completed    RSV VACCINE  Completed    HPV IMMUNIZATION  Aged Out    MENINGITIS IMMUNIZATION  Aged Out    COLORECTAL CANCER SCREENING  Discontinued            Objective    Exam  /80 (BP Location: Right arm, Patient Position: Sitting, Cuff Size: Adult Small)   Pulse 74   Temp 97.9  F (36.6  C) (Temporal)   Resp 16   Ht 1.702 m (5' 7\")   Wt 61 kg (134 lb 8 oz)   SpO2 97%   BMI 21.07 kg/m     Estimated body mass index is 21.07 kg/m  as calculated from the following:    Height as of this encounter: 1.702 m (5' 7\").    Weight as of this encounter: 61 kg (134 lb 8 oz).    Physical Exam  GENERAL: alert and no distress  EYES: Eyes grossly normal to inspection, PERRL and conjunctivae and sclerae normal  HENT: ear canals and TM's normal, nose and mouth without ulcers or lesions  NECK: no adenopathy, no asymmetry, masses, or scars  RESP: lungs clear to auscultation - no rales, rhonchi " or wheezes  CV: regular rate and rhythm, normal S1 S2, no S3 or S4, no murmur, click or rub, no peripheral edema  MS: no gross musculoskeletal defects noted, no edema  SKIN: no suspicious lesions or rashes  NEURO: Normal strength and tone, mentation intact and speech normal  PSYCH: mentation appears normal, affect normal/bright        4/24/2025   Mini Cog   Clock Draw Score 2 Normal   3 Item Recall 1 object recalled   Mini Cog Total Score 3              Signed Electronically by: Jamila Damon MD

## 2025-04-24 NOTE — PROGRESS NOTES
New Patient Oncology Nurse Navigator Note     Referring provider: Jamila Damon MD      Referring Clinic/Organization: [facility]      Referred to (specialty:) Medical Oncology      Date Referral Received: April 24, 2025     Evaluation for:  Z80.3 (ICD-10-CM) - Family history of malignant neoplasm of breast    Clinical History (per Nurse review of records provided):      Annette Holder is an 80 year old female with a family history of breast cancer. Chart review shows mother and two maternal aunts had breast cancer.     Annette states she has two sisters who underwent bilateral mastectomy. In our conversation she states she is not close with them since [place] She is not aware if either of them had genetic counseling or testing OR if either of them were diagnosed with breast cancer, but their doctors suggested they undergo bilateral mastectomies from what she knows.      Annette followed with the breast center at [place] until 3 years ago when they released her from care. She has been in a study raloxifene/tamoxifen at [facility] for 5 years when she was 55-60 years of age. She believes she was on tamoxifen arm and she recalls no significant side effects.    She has not had genetic counseling herself, per patient, and she is willing to come to see Tamra Chan PA-C [place] [facility] [place] discuss breast cancer screening recommendations. She believes she would tolerate breast MRI position if recommended.     Patient's most recent breast imaging was at [facility] on 9/30/24 with bilateral screening mammogram.    If no current breast symptoms, just schedule with JK no imaging. She had screener in September 2024 so no screening mammograms until September 2025. Imaging history per 9/30/24 comparisons  9/28/23 screener (location?)  8/5/22 - screener ([facility])  [facility] records needed but patient spoke of special imaging performed there for patients with dense breasts.  Informed her this was probably screening mammogram incorporating 3D tomosynthesis.  [facility] RECORDS NEEDED    4/25 0850 - Telephoned and left voice message for patient requesting call back. Generically explained my role and purpose for the call.   4/29 0832 - Telephoned and spoke with Annette. Explained my role and purpose for the call. She would be interested in meeting with Tamra Chan PA-C at [facility].   4/29 1308 - Writer received referral, reviewed for appropriate plan, and referral transferred to New Patient Scheduling for completion.    Patient resides in [place], [place].     Records Location: Care Everywhere, Media, and See Bookmarked material     Records Needed:   Breast imaging for past 5 years (no breast imaging on PACS 4/24 1665)  [facility] records including study information if available from approximately [id number]?

## 2025-04-24 NOTE — PATIENT INSTRUCTIONS
Get your covid booster in the pharmacy.     Patient Education   Preventive Care Advice   This is general advice given by our system to help you stay healthy. However, your care team may have specific advice just for you. Please talk to your care team about your preventive care needs.  Nutrition  Eat 5 or more servings of fruits and vegetables each day.  Try wheat bread, brown rice and whole grain pasta (instead of white bread, rice, and pasta).  Get enough calcium and vitamin D. Check the label on foods and aim for 100% of the RDA (recommended daily allowance).  Lifestyle  Exercise at least 150 minutes each week  (30 minutes a day, 5 days a week).  Do muscle strengthening activities 2 days a week. These help control your weight and prevent disease.  No smoking.  Wear sunscreen to prevent skin cancer.  Have a dental exam and cleaning every 6 months.  Yearly exams  See your health care team every year to talk about:  Any changes in your health.  Any medicines your care team has prescribed.  Preventive care, family planning, and ways to prevent chronic diseases.  Shots (vaccines)   HPV shots (up to age 26), if you've never had them before.  Hepatitis B shots (up to age 59), if you've never had them before.  COVID-19 shot: Get this shot when it's due.  Flu shot: Get a flu shot every year.  Tetanus shot: Get a tetanus shot every 10 years.  Pneumococcal, hepatitis A, and RSV shots: Ask your care team if you need these based on your risk.  Shingles shot (for age 50 and up)  General health tests  Diabetes screening:  Starting at age 35, Get screened for diabetes at least every 3 years.  If you are younger than age 35, ask your care team if you should be screened for diabetes.  Cholesterol test: At age 39, start having a cholesterol test every 5 years, or more often if advised.  Bone density scan (DEXA): At age 50, ask your care team if you should have this scan for osteoporosis (brittle bones).  Hepatitis C: Get tested at  least once in your life.  STIs (sexually transmitted infections)  Before age 24: Ask your care team if you should be screened for STIs.  After age 24: Get screened for STIs if you're at risk. You are at risk for STIs (including HIV) if:  You are sexually active with more than one person.  You don't use condoms every time.  You or a partner was diagnosed with a sexually transmitted infection.  If you are at risk for HIV, ask about PrEP medicine to prevent HIV.  Get tested for HIV at least once in your life, whether you are at risk for HIV or not.  Cancer screening tests  Cervical cancer screening: If you have a cervix, begin getting regular cervical cancer screening tests starting at age 21.  Breast cancer scan (mammogram): If you've ever had breasts, begin having regular mammograms starting at age 40. This is a scan to check for breast cancer.  Colon cancer screening: It is important to start screening for colon cancer at age 45.  Have a colonoscopy test every 10 years (or more often if you're at risk) Or, ask your provider about stool tests like a FIT test every year or Cologuard test every 3 years.  To learn more about your testing options, visit:   .  For help making a decision, visit:   https://bit.ly/qg94743.  Prostate cancer screening test: If you have a prostate, ask your care team if a prostate cancer screening test (PSA) at age 55 is right for you.  Lung cancer screening: If you are a current or former smoker ages 50 to 80, ask your care team if ongoing lung cancer screenings are right for you.  For informational purposes only. Not to replace the advice of your health care provider. Copyright   2023 [facility]. All rights reserved. Clinically reviewed by the [facility]. [employer] [id number] - REV 01/24.  Hearing Loss: Care Instructions  Overview     Hearing loss is a sudden or slow decrease in how well you hear. It can range from slight to profound. Permanent hearing  loss can occur with aging. It also can happen when you are exposed long-term to loud noise. Examples include listening to loud music, riding motorcycles, or being around other loud machines.  Hearing loss can affect your work and home life. It can make you feel lonely or depressed. You may feel that you have lost your independence. But hearing aids and other devices can help you hear better and feel connected to others.  Follow-up care is a key part of your treatment and safety. Be sure to make and go to all appointments, and call your doctor if you are having problems. It's also a good idea to know your test results and keep a list of the medicines you take.  How can you care for yourself at home?  Avoid loud noises whenever possible. This helps keep your hearing from getting worse.  Always wear hearing protection around loud noises.  Wear a hearing aid as directed.  A professional can help you pick a hearing aid that will work best for you.  You can also get hearing aids over the counter for mild to moderate hearing loss.  Have hearing tests as your doctor suggests. They can show whether your hearing has changed. Your hearing aid may need to be adjusted.  Use other devices as needed. These may include:  Telephone amplifiers and hearing aids that can connect to a television, stereo, radio, or microphone.  Devices that use lights or vibrations. These alert you to the doorbell, a ringing telephone, or a baby monitor.  Television closed-captioning. This shows the words at the bottom of the screen. Most new TVs can do this.  TTY (text telephone). This lets you type messages back and forth on the telephone instead of talking or listening. These devices are also called TDD. When messages are typed on the keyboard, they are sent over the phone line to a receiving TTY. The message is shown on a monitor.  Use text messaging, social media, and email if it is hard for you to communicate by telephone.  Try to learn a listening  "technique called speechreading. It is not lipreading. You pay attention to people's gestures, expressions, posture, and tone of voice. These clues can help you understand what a person is saying. Face the person you are talking to, and have them face you. Make sure the lighting is good. You need to see the other person's face clearly.  Think about counseling if you need help to adjust to your hearing loss.  When should you call for help?  Watch closely for changes in your health, and be sure to contact your doctor if:    You think your hearing is getting worse.     You have new symptoms, such as dizziness or nausea.   Where can you learn more?  Go to https://www.[employer].net/patiented  Enter R798 in the search box to learn more about \"Hearing Loss: Care Instructions.\"  Current as of: October 27, 2024  Content Version: 14.4    5267-2158 [employer].   Care instructions adapted under license by your healthcare professional. If you have questions about a medical condition or this instruction, always ask your healthcare professional. [employer] disclaims any warranty or liability for your use of this information.       "

## 2025-05-05 ENCOUNTER — OFFICE VISIT (OUTPATIENT)
Dept: NEUROLOGY | Facility: CLINIC | Age: 80
End: 2025-05-05
Payer: MEDICARE

## 2025-05-05 VITALS — DIASTOLIC BLOOD PRESSURE: 89 MMHG | HEART RATE: 76 BPM | OXYGEN SATURATION: 98 % | SYSTOLIC BLOOD PRESSURE: 145 MMHG

## 2025-05-05 DIAGNOSIS — G31.84 MILD COGNITIVE IMPAIRMENT: Primary | ICD-10-CM

## 2025-05-05 DIAGNOSIS — R42 VERTIGO: ICD-10-CM

## 2025-05-05 NOTE — LETTER
2025      Annette Holder  [address]  [place] [address]      Dear Colleague,    Thank you for referring your patient, Annette Holder, to the [facility]. Please see a copy of my visit note below.      Neurology Consultation    Patient Name:  Annette Holder  MRN:  [id number]    :  1945  Date of Service:  May 5, 2025  Primary care provider:  Jamila Damon        Chief Complaint: Dizziness     History of Present Illness:     Annette Holder is a 80 year old female who presents for evaluation of dizziness.     She has been having intermittent dizziness for at least the last 40 years.  Initially was more episodic and would have a flare up every couple of years.  In the last 2 years it has gotten more persistent.  The dizziness is not constant. Describes the dizziness as a spinning sensation.  Previously did not necessarily appreciate the dizziness with any particular movements, maybe bending down.  About 1 year ago she appreciated the dizziness with specific head movements.   Slipped on ice and hit her head.  Woke up vomiting, went to the ED. The episodes last for about 30 seconds but feel the lingering milder dizziness and sense of imbalance for a couple of hours to a day.  Can occur at any time of day but appreciates it more at night recently.  Had significant improvement with PT.  Did not have any issues when she went on a cruise, for about 2.5 weeks.  Still has issues when she lays down and turns her head to the right.      Denies headaches associated.  She does have hearing loss and sometimes wears hearing aids.  Denies sudden hearing loss or muffled hearing.  Does not typically have nausea and vomiting with the dizziness.  Denies dizziness being provoked being visual stimuli including walking in a busy store, scrolling on her phone, or being passenger in the car.  Denies Tullio's phenomenon.  Has seen ENT for dizziness.     She is about 99% with  vestibular therapy.     Memory:   - Started appreciating memory issues in the last 1-1.5 years.  Maybe started noticing this after the head trauma.  She feels like the memory issues are intermittent.  Denies gradual worsening.  Much more noticeable with stress.  She stopped driving due to fear of when she was going to get a vertigo episode.  Denies getting lost in familiar places.  She manages most of the finances without difficulty.  Denies problems managing appointments and medications.  Denies hallucinations. Denies speech changes.     Sleeps well.  Denies anxiety and depression.  Denies dream reenactment.  Denies anosmia.  Denies family history of dementia or movement disorders.      Review of Records   Saw ENT, reviewed evaluation from 12/2024    MRI brain W/WO: 1. No evidence of acute intracranial abnormality.   2. Mild to moderate supratentorial and mild infratentorial chronic small-vessel disease. Moderate parenchymal volume loss.   3. No pathologic enhancement.     MRA head: No evidence of proximal arterial occlusion, aneurysm, dissection, or vascular malformation.     MRA neck:   1. No proximal arterial occlusion, aneurysm, dissection, or vascular   malformation.   2. No hemodynamically significant stenosis at the carotid bifurcations.     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 Denies tobacco use, drinks 1 glass of wine a day, denies recreational drug use.      Physical Exam:  BP (!) 145/89   Pulse 76   SpO2 98%     General:  No acute distress  Cardiovascular: Normal rate.  Lung: Respirations are non-labored.  Extremities: Normal range of motion  Integumentary: Warm and dry    Neurologic:  Mental status : alert, oriented, remote and short term memory largely intact although occasional looks to her  to provide ancillary information   MOCA 24/30   1 pt lost for verbal fluency  (10 words)  5 pts lost for delayed word recall     LANGUAGE: Speech fluent, no dysarthria     CN:  II- pupils equal and reactive, visual fields full  III, IV, VI- extraocular movements intact  V- sensation intact bilaterally  VII- face symmetric  VIII- hearing intact, no nystagmus  IX, X- palate elevates symmetrically  XI- sternocleidomastoid 5/5  XII- tongue midline    MOTOR : intact bulk, no cogwheel rigidity in upper extremities   5/5 strength in all ext    SENSORY : intact to temp and vib in BUE, reduced to temp and vib in both lower extremities mildly.     REFLEXES:       Right Left   Triceps 2+ 2+   Biceps 2+ 2+   Brachioradialis 2+ 2+   Knee jerk 2 2+   Ankle jerk 2 2   Luciano absent   Toes down going     MOVEMENT/COORDINATION: finger to nose with mild action tremor, finger taps reduced amplitude and mildly speed however, rapid alternating movements appropriate speed and amplitude.  Heel to shin intact     GAIT : Appropriate stride length and speed, decent compensatory arm swing    Cognition and Speech: Oriented, Speech clear and coherent.    Psychiatric: Cooperative, Appropriate mood & affect.     Assessment/Plan:   Annette Holder is a 80 year old female who presents for evaluation of dizziness and cognitive impairment.     Dizziness  - Patient reports a long history of intermittent dizziness that became more persistent and more positional about 1 year ago after a concussion.  Recent dizziness sounds more peripheral in etiology and has appreciated significant improvement with vestibular therapy. MRI brain and MRA H/N were obtained (no images) that do not show a worrisome central cause for dizziness. Do not appreciate any signs suggestive of a neurodegenerative movement disorder that could be contributing. Encouraged continued work with vestibular therapy.     Mild cognitive impairment  - Has appreciated cognitive impairment in the last year also starting around the time that she had a concussion.  No  significant worsening and more fluctuations day to day. These cognitive changes have not negatively impacted her ADLs or iADLs so does not meet criteria for major neurocognitive disorder.  MOCA 24/30 primarily deficits in delayed word recall.  MRI brain (do not have images) does show mild to moderate chronic microvascular changes which raises the possibility of a vascular process.  Will check other treatable causes and requested outside imaging. Will also refer for neuropsychological testing. Could consider p [id number].    Plan  > Obtain outside MRI   > Check B1, B12, folate, and TSH   > Refer for neuropsychological testing     I spent 76 minutes providing care for this patient, including reviewing imaging, labs, and notes as well as providing counseling and coordination of care for the above issues.      Again, thank you for allowing me to participate in the care of your patient.        Sincerely,        Raven Blake, DO    Electronically signed

## 2025-05-05 NOTE — NURSING NOTE
"Annette Holder is a 80 year old female who presents for:  Chief Complaint   Patient presents with    Vertigo     referred by Cherelle Hayes        Initial Vitals:  BP (!) 145/89   Pulse 76   SpO2 98%  Estimated body mass index is 21.07 kg/m  as calculated from the following:    Height as of 4/24/25: 1.702 m (5' 7\").    Weight as of 4/24/25: 61 kg (134 lb 8 oz).. There is no height or weight on file to calculate BSA. BP completed using cuff size: katiana Garduno    "

## 2025-05-05 NOTE — PROGRESS NOTES
Neurology Consultation    Patient Name:  Annette Holder  MRN:  [id number]    :  1945  Date of Service:  May 5, 2025  Primary care provider:  Jamila Damon        Chief Complaint: Dizziness     History of Present Illness:     Annette Holder is a 80 year old female who presents for evaluation of dizziness.     She has been having intermittent dizziness for at least the last 40 years.  Initially was more episodic and would have a flare up every couple of years.  In the last 2 years it has gotten more persistent.  The dizziness is not constant. Describes the dizziness as a spinning sensation.  Previously did not necessarily appreciate the dizziness with any particular movements, maybe bending down.  About 1 year ago she appreciated the dizziness with specific head movements.   Slipped on ice and hit her head.  Woke up vomiting, went to the ED. The episodes last for about 30 seconds but feel the lingering milder dizziness and sense of imbalance for a couple of hours to a day.  Can occur at any time of day but appreciates it more at night recently.  Had significant improvement with PT.  Did not have any issues when she went on a cruise, for about 2.5 weeks.  Still has issues when she lays down and turns her head to the right.      Denies headaches associated.  She does have hearing loss and sometimes wears hearing aids.  Denies sudden hearing loss or muffled hearing.  Does not typically have nausea and vomiting with the dizziness.  Denies dizziness being provoked being visual stimuli including walking in a busy store, scrolling on her phone, or being passenger in the car.  Denies Tullio's phenomenon.  Has seen ENT for dizziness.     She is about 99% with vestibular therapy.     Memory:   - Started appreciating memory issues in the last 1-1.5 years.  Maybe started noticing this after the head trauma.  She feels like the memory issues are intermittent.  Denies gradual worsening.  Much more noticeable  with stress.  She stopped driving due to fear of when she was going to get a vertigo episode.  Denies getting lost in familiar places.  She manages most of the finances without difficulty.  Denies problems managing appointments and medications.  Denies hallucinations. Denies speech changes.     Sleeps well.  Denies anxiety and depression.  Denies dream reenactment.  Denies anosmia.  Denies family history of dementia or movement disorders.      Review of Records   Saw ENT, reviewed evaluation from 12/2024    MRI brain W/WO: 1. No evidence of acute intracranial abnormality.   2. Mild to moderate supratentorial and mild infratentorial chronic small-vessel disease. Moderate parenchymal volume loss.   3. No pathologic enhancement.     MRA head: No evidence of proximal arterial occlusion, aneurysm, dissection, or vascular malformation.     MRA neck:   1. No proximal arterial occlusion, aneurysm, dissection, or vascular   malformation.   2. No hemodynamically significant stenosis at the carotid bifurcations.     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 Denies tobacco use, drinks 1 glass of wine a day, denies recreational drug use.      Physical Exam:  BP (!) 145/89   Pulse 76   SpO2 98%     General:  No acute distress  Cardiovascular: Normal rate.  Lung: Respirations are non-labored.  Extremities: Normal range of motion  Integumentary: Warm and dry    Neurologic:  Mental status : alert, oriented, remote and short term memory largely intact although occasional looks to her  to provide ancillary information   MOCA 24/30   1 pt lost for verbal fluency (10 words)  5 pts lost for delayed word recall     LANGUAGE: Speech fluent, no dysarthria     CN:  II- pupils equal and reactive, visual fields full  III, IV, VI- extraocular movements intact  V- sensation intact bilaterally  VII- face  symmetric  VIII- hearing intact, no nystagmus  IX, X- palate elevates symmetrically  XI- sternocleidomastoid 5/5  XII- tongue midline    MOTOR : intact bulk, no cogwheel rigidity in upper extremities   5/5 strength in all ext    SENSORY : intact to temp and vib in BUE, reduced to temp and vib in both lower extremities mildly.     REFLEXES:       Right Left   Triceps 2+ 2+   Biceps 2+ 2+   Brachioradialis 2+ 2+   Knee jerk 2 2+   Ankle jerk 2 2   Luciano absent   Toes down going     MOVEMENT/COORDINATION: finger to nose with mild action tremor, finger taps reduced amplitude and mildly speed however, rapid alternating movements appropriate speed and amplitude.  Heel to shin intact     GAIT : Appropriate stride length and speed, decent compensatory arm swing    Cognition and Speech: Oriented, Speech clear and coherent.    Psychiatric: Cooperative, Appropriate mood & affect.     Assessment/Plan:   Annette Holder is a 80 year old female who presents for evaluation of dizziness and cognitive impairment.     Dizziness  - Patient reports a long history of intermittent dizziness that became more persistent and more positional about 1 year ago after a concussion.  Recent dizziness sounds more peripheral in etiology and has appreciated significant improvement with vestibular therapy. MRI brain and MRA H/N were obtained (no images) that do not show a worrisome central cause for dizziness. Do not appreciate any signs suggestive of a neurodegenerative movement disorder that could be contributing. Encouraged continued work with vestibular therapy.     Mild cognitive impairment  - Has appreciated cognitive impairment in the last year also starting around the time that she had a concussion.  No significant worsening and more fluctuations day to day. These cognitive changes have not negatively impacted her ADLs or iADLs so does not meet criteria for major neurocognitive disorder.  MOCA 24/30 primarily deficits in delayed word  recall.  MRI brain (do not have images) does show mild to moderate chronic microvascular changes which raises the possibility of a vascular process.  Will check other treatable causes and requested outside imaging. Will also refer for neuropsychological testing. Could consider p [id number].    Plan  > Obtain outside MRI   > Check B1, B12, folate, and TSH   > Refer for neuropsychological testing     I spent 76 minutes providing care for this patient, including reviewing imaging, labs, and notes as well as providing counseling and coordination of care for the above issues.

## 2025-05-12 ENCOUNTER — THERAPY VISIT (OUTPATIENT)
Dept: PHYSICAL THERAPY | Facility: CLINIC | Age: 80
End: 2025-05-12
Payer: MEDICARE

## 2025-05-12 DIAGNOSIS — R42 DIZZINESS: Primary | ICD-10-CM

## 2025-05-12 PROCEDURE — 97112 NEUROMUSCULAR REEDUCATION: CPT | Mod: GP

## 2025-05-12 NOTE — PROGRESS NOTES
05/12/25 0500   Appointment Info   Signing clinician's name / credentials Darlin Gomez, PT, DPT, NCS   Total/Authorized Visits 4/6 in PT POC   Visits Used 4   Medical Diagnosis BPPV; dizziness; imbalance   PT Tx Diagnosis BPPV; dizziness; imbalance   Other pertinent information Hx of concussion, BPPV, and central dizziness.   Quick Adds Certification   Progress Note/Certification   Start of Care Date 03/21/25   Onset of illness/injury or Date of Surgery 03/21/25   Therapy Frequency 1x/week with reduction to x1/month as appropriate   Predicted Duration 60 days   Certification date from 04/19/25   Certification date to 06/17/25   Progress Note Due Date   (at 10th)   Progress Note Completed Date 03/21/25   GOALS   PT Goals 2;3   PT Goal 1   Goal Identifier HEP   Goal Description Pt will demonstrate IND with BPPV repositioning techniques to promote reduction of vestibular symptoms and improve safety with functional mobility, if symptoms return.   Goal Progress 5/12/2025: Progress pt's HEP at each session.   Target Date 06/17/25   PT Goal 2   Goal Identifier Subjective Dizziness   Goal Description Pt will report </=0-2/10 dizziness with all body movement for subjective reduction of dizziness symptoms with completion of daily activities.   Target Date 06/17/25   PT Goal 3   Goal Identifier RESOLVED: Positional Testing   Goal Description Pt will complete BPPV positional testing without observed nystagmus and/or increase of dizziness.   Goal Progress 4/1/2025: Pt's BPPV has cleared.   Target Date 05/21/25   Date Met 04/01/25   Subjective Report   Subjective Report Pt reporting no dizziness on her cruise; tolerated rough seas without trouble. She feels like she has dizziness with rolling in bed to the R. Only happens initially when she get's in to bed. This started after prolonged head positioning down. She's not dizzy with PT HEP, just a little off-balance.   Treatment Interventions (PT)   Interventions Infrared Goggle  Exam or Frenzel Lense Exam;Neuromuscular Re-education   Neuromuscular Re-education   Neuromuscular re-ed of mvmt, balance, coord, kinesthetic sense, posture, proprioception minutes (51304) 38   Neuromuscular Re-education Neuro Re-ed 2   Neuro Re-ed 1 Completed positional testing; see below. Pt with no active BPPV & pt edu provided to support finding. PT continues to suspect central dizziness; will work on habituation program.   Neuro Re-ed 2 Provided vestibular habituation program ofamb with HHT and VHT for 35ft x2 of each, and static stance with FT and EC on a firm surface for 30x3. She's been doing HHT/VHT at home & does feel that it makes her off-balance. PT explained benefit of head movement to promote and habituate vestibular function. She's in support & will continue the exercise. Further recommendation for taking breaks when she's doing yard-work like gardening (ie. sit tall; move your head every 15-20min; stand up/walk around; etc.) Pt agreeable and recognizes the benefit.   Skilled Intervention Facilitated vestibular rehabilitation, static/dynamic balance, and postural stability training with cues and assistance as needed and progressive increase of difficulty as needed to promote improved safety and independence in the home and community, education provided for home exercise program understanding.   Infrared Goggle Exam or Frenzel Lense Exam   Vestibular Suppressant in Last 24 Hours? No   Exam completed with Infrared Goggles   Spontaneous Nystagmus Negative   Spontaneous Nystagmus comments Pt asymptomatic.   Gaze Evoked Nystagmus Upbeating;Downbeating   Gaze Evoked Nystagmus comments nystagmus matches direction of gaze (central sign); she's had MRIs and is seeing Neurology; the nystagmus is very slight; pt asymptomatic.   Head Shake Horizontal Nystagmus Negative   Eddie-Hallpike (Right) Negative   [place]-Hallpike (Right) Comments Pt asymptomatic.   Eddie-Hallpike (Left) Horizontal L   Eddie-Hallpike (Left) Comments  Pt asymptomatic.   HSCC Supine Roll Test (Right) Negative   HSCC Supine Roll Test (Right) Comments Pt asymptomatic.   HSCC Supine Roll Test (Left) Horizontal L   HSCC Supine Roll Test (Left) Comments  Pt asymptomatic.   Supine Head-Hanging Test Other   Supine Head-Hanging Test Comments Not tested today.   Other Infrared Goggle Exam or Frenzel Lense Exam Comments Suspecting BPPV has cleared. Her nystagmus presentation is the same as previous.   Education   Learner/Method Patient;Listening;Reading;Demonstration;Pictures/Video   Plan   Home program HEP   Plan for next session progress pt's habituation program as needed; follow-up on Neurolgoy visits & Neuropsych testing   Total Session Time   Timed Code Treatment Minutes 38   Total Treatment Time (sum of timed and untimed services) 38           [facility]                                                                                   OUTPATIENT PHYSICAL THERAPY    PLAN OF TREATMENT FOR OUTPATIENT REHABILITATION   Patient's Last Name, First Name, Annette Roy YOB: 1945   Provider's Name   [facility]   Medical Record No.  [id number]     Onset Date: 03/21/25  Start of Care Date: 03/21/25     Medical Diagnosis:  BPPV; dizziness; imbalance      PT Treatment Diagnosis:  BPPV; dizziness; imbalance Plan of Treatment  Frequency/Duration: 1x/week with reduction to x1/month as appropriate/ 60 days    Certification date from 04/19/25 to 06/17/25         See note for plan of treatment details and functional goals     Darlin Gomez, PT, DPT, NCS                         I CERTIFY THE NEED FOR THESE SERVICES FURNISHED UNDER        THIS PLAN OF TREATMENT AND WHILE UNDER MY CARE     (Physician attestation of this document indicates review and certification of the therapy plan).              Referring Provider:  Justin Nolan MD    Initial Assessment  See Epic Evaluation- Start of Care  Date: 03/21/25

## 2025-05-20 ENCOUNTER — THERAPY VISIT (OUTPATIENT)
Dept: OCCUPATIONAL THERAPY | Facility: CLINIC | Age: 80
End: 2025-05-20
Attending: INTERNAL MEDICINE
Payer: MEDICARE

## 2025-05-20 DIAGNOSIS — Z78.9 ALTERATION IN INSTRUMENTAL ACTIVITIES OF DAILY LIVING (IADL): ICD-10-CM

## 2025-05-20 DIAGNOSIS — R41.3 MEMORY LOSS: ICD-10-CM

## 2025-05-20 DIAGNOSIS — Z78.9 DECREASED ACTIVITIES OF DAILY LIVING (ADL): Primary | ICD-10-CM

## 2025-05-20 PROCEDURE — 96125 COGNITIVE TEST BY HC PRO: CPT | Mod: GO | Performed by: OCCUPATIONAL THERAPIST

## 2025-05-20 NOTE — PROGRESS NOTES
OCCUPATIONAL THERAPY EVALUATION  Type of Visit: Evaluation       Fall Risk Screen:  Have you fallen 2 or more times in the past year?: No  Have you fallen and had an injury in the past year?: No  Is patient receiving Physical Therapy Services?: Yes    Subjective        Presenting condition or subjective complaint: memory  Date of onset: 04/24/25 (orders date)    Relevant medical history: Arthritis; Dizziness; Hearing problems; Menopause; Rheumatoid arthritis   Past Medical History:   Diagnosis Date    S/P total knee arthroplasty, right 08/28/2024       Dates & types of surgery:  No past surgical history on file.      Prior diagnostic imaging/testing results:       Prior therapy history for the same diagnosis, illness or injury: No      Prior Level of Function  Transfers: Independent  Ambulation: Independent  ADL: Independent  IADL: Housekeeping, Laundry, Medication management    Living Environment  Social support:   Lives with spouse  Type of home:     Stairs to enter the home:         Ramp:     Stairs inside the home:         Help at home:    Equipment owned:       Employment:    retired   Hobbies/Interests:      Patient goals for therapy: remember    Pain assessment: Pain denied     Objective     Cognitive Status Examination  Orientation: Person, Place Oriented to month, day of the week and year  Level of Consciousness: Alert  Follows Commands and Answers Questions: 100% of the time  Personal Safety and Judgement:   Memory: Impaired Pt recalled 0/5 words after a five min delay.  Pt scored 23/30 BNL on [employer] cog screen where WNL is 26/30 and greater  Attention: No deficits identified  Organization/Problem Solving: No deficits identified  Executive Function: No deficits identified    VISUAL SKILLS  Visual Acuity: Wears glasses  Visual Field: Appears normal  Visual Attention: Appears normal  Oculomotor:     SENSATION: UE Sensation WNL    POSTURE: WNL  RANGE OF MOTION: UE AROM WFL  STRENGTH: UE  Strength WFL  MUSCLE TONE:   COORDINATION: WFL  BALANCE: Pt notes intermittent dizziness. Pt sees PT for treatement    UPPER BODY DRESSING: WFL  Equipment:     LOWER BODY DRESSING: WFL  Equipment:     TOILETING: WFL  Equipment:     GROOMING: WFL  Equipment:     EATING/SELF FEEDING: WFL   Equipment:     ACTIVITY TOLERANCE:WFL    INSTRUMENTAL ACTIVITIES OF DAILY LIVING (IADL): Pt and spouse share home management tasks.  Pts spouse primarily completes meal planning and prep and transportation tasks.     Assessment & Plan   CLINICAL IMPRESSIONS  Medical Diagnosis: Memory Loss    Treatment Diagnosis: decreased adls/iadls    Impression/Assessment: Pt is a 80 year old female presenting to Occupational Therapy due to Memory Loss.  The following significant findings have been identified: Impaired balance and Impaired cognition.  These identified deficits interfere with their ability to perform self care tasks, household chores, driving , financial management, and meal planning and preparation as compared to previous level of function.     Clinical Decision Making (Complexity):  Assessment of Occupational Performance: 3-5 Performance Deficits  Occupational Performance Limitations: driving and community mobility, home establishment and management, meal preparation and cleanup, shopping, and social participation  Clinical Decision Making (Complexity): Moderate complexity    PLAN OF CARE  Treatment Interventions:  Interventions: Self-Care/Home Management    Long Term Goals   OT Goal 1  Goal Identifier: Memory compensation strategies  Goal Description: Pt will complete numerical reasoning,  complex prob solving, and  short-term memory tasks (using compensatory strategies prn) in the appropriate amount of time and with 90% accuracy to improve problem solving ability and increase safety and independence with ADL/IADLs at home, work and in the community  Target Date: 06/30/25  OT Goal 2  Goal Identifier: Safety with community  mobility  Goal Description: Patient will demonstrate WFLs visual scanning (organized scanning pattern), visual reaction time and divided attention skills using compensatory strategies prn, for safe independent community mobility (crossing the street, driving etc.) and increased safety with ADL/IADLs at home  by scoring WNLs on all modes of the Dynavision and pen/paper scanning tasks.  Target Date: 06/30/25      Frequency of Treatment: 1x/week  Duration of Treatment: 6 weeks     Recommended Referrals to Other Professionals:   Education Assessment: Learner/Method: Patient;Significant Other;Listening;Reading  Education Comments: Pt instructed in role OT, plan of care, goals and safety recommendations.     Risks and benefits of evaluation/treatment have been explained.   Patient/Family/caregiver agrees with Plan of Care.     Evaluation Time:            Signing Clinician: DIMITRIOS [facility]                                                                                   OUTPATIENT OCCUPATIONAL THERAPY      PLAN OF TREATMENT FOR OUTPATIENT REHABILITATION   Patient's Last Name, First Name, Annette Roy YOB: 1945   Provider's Name   [facility]   Medical Record No.  [id number]     Onset Date: 04/24/25 (orders date) Start of Care Date: 05/20/25     Medical Diagnosis:  Memory Loss      OT Treatment Diagnosis:  decreased adls/iadls Plan of Treatment  Frequency/Duration:1x/week/6 weeks    Certification date from 05/20/25   To 06/30/25        See note for plan of treatment details and functional goals     Marcia Bocanegra OT                         I CERTIFY THE NEED FOR THESE SERVICES FURNISHED UNDER        THIS PLAN OF TREATMENT AND WHILE UNDER MY CARE     (Physician attestation of this document indicates review and certification of the therapy plan).              Referring Provider:  Jamila Huizar  Assessment  See Epic Evaluation- 05/20/25

## 2025-05-20 NOTE — PROGRESS NOTES
Cognitive Performance Test    SUMMARY OF TEST:    The Cognitive Performance Test (CPT) is a standardized performance-based assessment to measure working memory/executive function processing capacities that underlie functional performance. Subtasks include common basic and instrumental activities of daily living (ADL/IADL) which are rated based on the manner in which patients respond to task demands of varying complexity. The total CPT score describes a level of functioning that indicates how information is processed, implications for functional activities, potential safety risks and a recommended level of supervision or assist based on cognitive function. The highest total score on this test is in the range of 5.6 to 5.8.    DATE OF TESTIN2025    RESULTS OF TESTING:                                                                                         CPT Subtest Results    MEDBOX:  SHOP/GLOVES:  PHONE: 4.   WASH:   TRAVEL:  TOAST:    DRESS:    TOTAL CPT SCORE:  35.5/39     Average CPT Score  5.07/5.6    INTERPRETATION OF TEST RESULTS:    Based on the Cognitive Performance Test, this patient scored at CPT Level 5.0.  See CPT Levels reference below.    Summary of functional cognitive status:   Mild functional decline, start of difficulty with complex tasks- Shows start of decline in memory, keeping track of details, decision-making, planning, insight and organization. Can learn new information.     Factors affecting performance:  No additional problems noted    Recommendations:    Assist for ADL/IADL:  Meal preparation  Supervision for ADL/IADL:  Finances  Supervision in living setting:  Weekly checks                                                       TIME ADMINISTERING TEST: 70 min    TIME FOR INTERPRETATION AND PREPARATION OF REPORT: 15 min    TOTAL TIME: 85 min      CPT Levels Reference:    Patient's Average CPT Score:  5.07                                                            "                                                                                       Individual scores range along a continuum as outlined below.  In addition to cognitive status, other factors may affect safety in a home environment.  Please refer to specific recommendations for this patient.    ___5.6-5.8  Normal functioning (absence of cognitive-functional disability).  Independent in managing personal affairs, monitors and directs own behavior.  Uses complex information to carry out daily activities with safety and accuracy.    Proficient with instrumental activities of daily living (IADL) and learning new activity.  Problems are anticipated, errors are avoided, and consequences of actions are considered.      _x__5.0   Mild cognitive-functional disability; deficits in working memory and executive thought processes. Difficulty using complex information. Problems may be observed with recent memory, judgment, reasoning and planning ahead. May be impulsive or have difficulty anticipating consequences.  Safety:  May require assistance to plan ahead; or to manage complex medication schedules, appointments or finances.  Hazardous activities may need to be monitored or limited.  ADL:  Mild functional decline.  Able to complete basic self-care and routine household tasks.  May have difficulty with complex daily tasks such as reading, writing, meal preparation, shopping or driving.   Learns through hands on teaching. Self-centered behavior or difficulty considering the needs of others may be seen related to trouble seeing the  whole picture\". Can appear disorganized or uninhibited.    ___4.5  Mild to moderate cognitive-functional disability. Significant deficits in working memory and executive thought processes. Judgment, reasoning and planning show obvious impairment.  Distractible with inability to shift attention/actions given competing stimuli.  Difficulty with problem solving and managing details. Complex daily " tasks performed with inconsistency, difficulty, or error.     Safety:  Medications should be monitored, stove use may require supervision, and driving ability may be affected.  Impaired safety awareness with inability to anticipate potential problems.  May not recognize or respond to emergent situations. Requires frequent check-in support.   ADL:  Mild difficulty with simple everyday self-care tasks. Benefits from structured, routine activity.  Will likely need reminders to complete tasks outside of the routine. Requires assistance with planning and IADL tasks like shopping and finances. Learns concrete tasks through repetition, but performance may not generalize. Tends to be impulsive with poor insight. Self centered behavior or inability to consider the needs of others is common.    ___4.0  Moderate cognitive-functional disability; abstract to concrete thought processes. Working memory and executive function impairments are obvious. Difficulty with planning and problem solving.  Behavior is goal-directed, but unable to follow multi-step directions, is easily distracted, and may not recognize mistakes.  Inability to anticipate hazards or understand precautions.  Safety:  Recommend 24-hour supervision for safety. Supervision needed for medication management and for hazardous activities. May not be able to follow a restricted diet. Can get lost in unfamiliar surroundings. Generally, persons functioning at level 4 should not be driving.   ADL:  Some decline in quality or frequency of ADL.  [place] enhanced by use of a routine, simple concrete directions, and caregiver set-up of needed items. Complex tasks such as money or home management typically requires assistance.  Relies heavily on vision to guide behavior; will ignore objects/hazards not in plain sight and can be distracted by irrelevant objects. Often has poor insight.  Able to carry out social conversation and may verbally  cover  for deficits leading  caregivers to believe they are capable of functioning independently.       ___3.5  Moderate cognitive-functional disability; increased cues needed for task completion. Aware of concrete task steps but needs prompting or cues to initiate and complete simple tasks. Attention span is limited, simple directions may need to be repeated, and re-focus to a topic or task may be required.  Safety:  24-hour supervision required for safety and for assistance with daily tasks. Assistance required with medications, and access to medication should be limited. Meals, nutrition and dietary restrictions need to be monitored.  All hazardous activities should be restricted or supervised. Should not drive. Prone to wandering and can become lost.  ADL:  Moderate functional decline. Familiar tasks usually requires set-up of supplies and directions to complete steps. May need objects handed to them for task initiation. Function best with a set schedule in familiar surroundings with familiar people. All complex tasks must be done by others. Vocabulary is diminished and speech often unfocused.          05/20/25 0500   Appointment Info   Treating Provider Marcia LÓPEZ OTR/L   Visits Used 1   Medical Diagnosis Memory Loss   OT Tx Diagnosis decreased adls/iadls   Other pertinent information Pt is 79 yo female who has been noticing memory changes.  Pt was accompanied by her  Shaw.   Progress Note/Certification   Start Of Care Date 05/20/25   Onset of Illness/Injury or Date of Surgery 04/24/25  (orders date)   Therapy Frequency 1x/week   Predicted Duration 6 weeks   Certification date from 05/20/25   Certification date to 06/30/25   Progress Note Completed Date 05/20/25   OT Goal 1   Goal Identifier Memory compensation strategies   Goal Description Pt will complete numerical reasoning,  complex prob solving, and  short-term memory tasks (using compensatory strategies prn) in the appropriate amount of time and with 90% accuracy to  improve problem solving ability and increase safety and independence with ADL/IADLs at home, work and in the community   Target Date 06/30/25   OT Goal 2   Goal Identifier Safety with community mobility   Goal Description Patient will demonstrate WFLs visual scanning (organized scanning pattern), visual reaction time and divided attention skills using compensatory strategies prn, for safe independent community mobility (crossing the street, driving etc.) and increased safety with ADL/IADLs at home  by scoring WNLs on all modes of the Dynavision and pen/paper scanning tasks.   Target Date 06/30/25   Subjective Report   Subjective Report Pt notes she hasn't been driving due to dizziness, but she recently took a short drive and did well.   Objective Measure 1   Objective Measure [employer]   Details 23/30 BNL where WNL is 26/30 and greater.  Pt recalled 0/5 novel words afer a delay   Objective Measure 2   Objective Measure Cognitive Performance Test   Details Score 5.07, Level 5.0  see separate CPT note for details   Eval/Assessments   Assessments Cognitive Testing   Standardized Cognitive Testing Minutes (89348) 70 Minutes   Education   Learner/Method Patient;Significant Other;Listening;Reading   Education Comments Pt instructed in role OT, plan of care, goals and safety recommendations.   Plan   Plan for next session f/u on fxl math, teach memory comp, kings' card trick,  CAM as needed,  Memory with distractions, memory questionnaire.   Total Session Time   Timed Code Treatment Minutes 70   Total Treatment Time (sum of timed and untimed services) 70

## 2025-05-28 ENCOUNTER — THERAPY VISIT (OUTPATIENT)
Dept: OCCUPATIONAL THERAPY | Facility: CLINIC | Age: 80
End: 2025-05-28
Payer: MEDICARE

## 2025-05-28 DIAGNOSIS — Z78.9 ALTERATION IN INSTRUMENTAL ACTIVITIES OF DAILY LIVING (IADL): ICD-10-CM

## 2025-05-28 DIAGNOSIS — R41.3 MEMORY LOSS: Primary | ICD-10-CM

## 2025-05-28 DIAGNOSIS — Z78.9 DECREASED ACTIVITIES OF DAILY LIVING (ADL): ICD-10-CM

## 2025-05-28 PROCEDURE — 97535 SELF CARE MNGMENT TRAINING: CPT | Mod: GO | Performed by: OCCUPATIONAL THERAPIST

## 2025-06-04 ENCOUNTER — THERAPY VISIT (OUTPATIENT)
Dept: OCCUPATIONAL THERAPY | Facility: CLINIC | Age: 80
End: 2025-06-04
Payer: MEDICARE

## 2025-06-04 ENCOUNTER — TRANSCRIBE ORDERS (OUTPATIENT)
Dept: OTHER | Age: 80
End: 2025-06-04

## 2025-06-04 DIAGNOSIS — M19.012 OSTEOARTHRITIS OF GLENOHUMERAL JOINT, LEFT: Primary | ICD-10-CM

## 2025-06-04 DIAGNOSIS — Z78.9 DECREASED ACTIVITIES OF DAILY LIVING (ADL): ICD-10-CM

## 2025-06-04 DIAGNOSIS — M67.912 TENDINOPATHY OF LEFT ROTATOR CUFF: ICD-10-CM

## 2025-06-04 DIAGNOSIS — R41.3 MEMORY LOSS: Primary | ICD-10-CM

## 2025-06-04 DIAGNOSIS — Z78.9 ALTERATION IN INSTRUMENTAL ACTIVITIES OF DAILY LIVING (IADL): ICD-10-CM

## 2025-06-04 PROCEDURE — 97535 SELF CARE MNGMENT TRAINING: CPT | Mod: GO | Performed by: OCCUPATIONAL THERAPIST

## 2025-06-05 ENCOUNTER — THERAPY VISIT (OUTPATIENT)
Dept: PHYSICAL THERAPY | Facility: CLINIC | Age: 80
End: 2025-06-05
Payer: MEDICARE

## 2025-06-05 DIAGNOSIS — M67.912 TENDINOPATHY OF LEFT ROTATOR CUFF: ICD-10-CM

## 2025-06-05 DIAGNOSIS — M19.012 OSTEOARTHRITIS OF GLENOHUMERAL JOINT, LEFT: Primary | ICD-10-CM

## 2025-06-05 ASSESSMENT — ACTIVITIES OF DAILY LIVING (ADL)
TOUCHING_THE_BACK_OF_YOUR_NECK: 3
PUTTING_ON_A_SHIRT_THAT_BUTTONS_DOWN_THE_FRONT: 0
WASHING_YOUR_BACK: 5
REMOVING_SOMETHING_FROM_YOUR_BACK_POCKET: 0
CARRYING_A_HEAVY_OBJECT_OF_10_POUNDS: 2
REACHING_FOR_SOMETHING_ON_A_HIGH_SHELF: 0
PLACING_AN_OBJECT_ON_A_HIGH_SHELF: 1
WHEN_LYING_ON_THE_INVOLVED_SIDE: 7
PUTTING_ON_YOUR_PANTS: 0
PUSHING_WITH_THE_INVOLVED_ARM: 2
PUTTING_ON_AN_UNDERSHIRT_OR_A_PULLOVER_SWEATER: 0
WASHING_YOUR_HAIR?: 0
AT_ITS_WORST?: 7
PLEASE_INDICATE_YOR_PRIMARY_REASON_FOR_REFERRAL_TO_THERAPY:: SHOULDER

## 2025-06-05 NOTE — PROGRESS NOTES
PHYSICAL THERAPY EVALUATION  Type of Visit: Evaluation       Fall Risk Screen:  Have you fallen 2 or more times in the past year?: No  Have you fallen and had an injury in the past year?: Yes  Is patient receiving Physical Therapy Services?: Yes    Subjective         Presenting condition or subjective complaint: shoulder pain  Date of onset: 09/05/25    Relevant medical history: Arthritis; Dizziness; Hearing problems; Menopause; Rheumatoid arthritis   Dates & types of surgery: knee replacement    Prior diagnostic imaging/testing results: X-ray     Prior therapy history for the same diagnosis, illness or injury: No      Prior Level of Function  Transfers:   Ambulation:   ADL:   IADL:     Living Environment  Social support: With a significant other or spouse   Type of home: House   Stairs to enter the home: Yes 2 Is there a railing: No     Ramp: No   Stairs inside the home: Yes 9 Is there a railing: Yes     Help at home: None  Equipment owned:       Employment: No    Hobbies/Interests:      Patient goals for therapy: sleep    Pt reports getting an injection in her shoulder a couple days ago. She could feel it a little bit this morning. Pain initially started a few months ago. Would feel it with different movement and make it harder to go to sleep.     Pain assessment:      Objective   SHOULDER EVALUATION  PAIN: Pain Level at Rest: 0/10  Pain Level with Use: 5/10  Pain Location: shoulder  Pain Quality: Sharp  Pain is Exacerbated By: rolling on your shoulder, reaching  Pain Progression: improved with recent injection  INTEGUMENTARY (edema, incisions):   POSTURE: Sitting Posture: Rounded shoulders, Forward head  GAIT:   Weightbearing Status:   Assistive Device(s):   Gait Deviations:   BALANCE/PROPRIOCEPTION:   WEIGHTBEARING ALIGNMENT:   ROM: AROM WNL    STRENGTH:   Pain: - none + mild ++ moderate +++ severe  Strength Scale: 0-5/5 Left Right   Shoulder Flexion 5 5   Shoulder Extension     Shoulder Abduction 5 5   Shoulder  Adduction     Shoulder Internal Rotation 5 5   Shoulder External Rotation 4+ 5   Shoulder Horizontal Abduction     Shoulder Horizontal Adduction     Elbow Flexion 5 5   Elbow Extension 5 5   Mid Trap     Lower Trap     Rhomboid     Serratus Anterior       FLEXIBILITY:   SPECIAL TESTS:   PALPATION:   + Tenderness At Location Left Right   Clavicle     Sternoclavicular     Acromioclavicular -    Biceps     Triceps     Supraspinatus -    Infraspinatus +    Teres minor +    Subscapularis     Deltoid     Levator     Rhomboids     Upper trap     Incisional     Bicipital groove       JOINT MOBILITY:   CERVICAL SCREEN:     Assessment & Plan   CLINICAL IMPRESSIONS  Medical Diagnosis: Osteoarthritis of glenohumeral joint, left  Tendinopathy of left rotator cuff    Treatment Diagnosis: Osteoarthritis of glenohumeral joint, left Tendinopathy of left rotator cuff   Impression/Assessment: Patient is a 80 year old female with left shoulder pain complaints.  The following significant findings have been identified: Pain, Decreased strength, and Impaired posture. These impairments interfere with their ability to perform self care tasks, recreational activities, and household chores as compared to previous level of function.     Clinical Decision Making (Complexity):  Clinical Presentation: Stable/Uncomplicated  Clinical Presentation Rationale: based on medical and personal factors listed in PT evaluation  Clinical Decision Making (Complexity): Low complexity    PLAN OF CARE  Treatment Interventions:  Modalities: Cryotherapy, Hot Pack  Interventions: Manual Therapy, Neuromuscular Re-education, Therapeutic Activity, Therapeutic Exercise, Self-Care/Home Management    Long Term Goals     PT Goal 1  Goal Identifier: Sleep  Goal Description: Pt will be able to sleep throughout the night without waking from pain for >1 week  Target Date: 07/31/25      Frequency of Treatment: 1x every 2-3 weeks  Duration of Treatment: 8 weeks    Recommended  Referrals to Other Professionals:   Education Assessment:   Learner/Method: Patient;Pictures/Video    Risks and benefits of evaluation/treatment have been explained.   Patient/Family/caregiver agrees with Plan of Care.     Evaluation Time:     PT Eval, Low Complexity Minutes (75847): 20       Signing Clinician: Ashley Rivas, PT        [facility]                                                                                   OUTPATIENT PHYSICAL THERAPY      PLAN OF TREATMENT FOR OUTPATIENT REHABILITATION   Patient's Last Name, First Name, Annette Roy YOB: 1945   Provider's Name   [facility]   Medical Record No.  [id number]     Onset Date: 09/05/25  Start of Care Date: 06/05/25     Medical Diagnosis:  Osteoarthritis of glenohumeral joint, left  Tendinopathy of left rotator cuff      PT Treatment Diagnosis:  Osteoarthritis of glenohumeral joint, left Tendinopathy of left rotator cuff Plan of Treatment  Frequency/Duration: 1x every 2-3 weeks/ 8 weeks    Certification date from 06/05/25 to 07/31/25         See note for plan of treatment details and functional goals     Ashley Rivas, PT                         I CERTIFY THE NEED FOR THESE SERVICES FURNISHED UNDER        THIS PLAN OF TREATMENT AND WHILE UNDER MY CARE .             Physician Signature               Date    X_____________________________________________________                  Referring Provider:  Marco Cee    Initial Assessment  See Epic Evaluation- Start of Care Date: 06/05/25

## 2025-06-26 ENCOUNTER — THERAPY VISIT (OUTPATIENT)
Dept: PHYSICAL THERAPY | Facility: CLINIC | Age: 80
End: 2025-06-26
Payer: MEDICARE

## 2025-06-26 DIAGNOSIS — M19.012 OSTEOARTHRITIS OF GLENOHUMERAL JOINT, LEFT: Primary | ICD-10-CM

## 2025-06-26 DIAGNOSIS — M67.912 TENDINOPATHY OF LEFT ROTATOR CUFF: ICD-10-CM

## 2025-06-26 NOTE — PROGRESS NOTES
DISCHARGE  Reason for Discharge: Patient has met all goals.    Equipment Issued: theraband    Discharge Plan: Patient to continue home program.    Referring Provider:  Marco Cee     06/26/25 0500   Appointment Info   Signing clinician's name / credentials Ashley Rivas, PT, DPT, [place]   Total/Authorized Visits (E&T)   Visits Used 2   Medical Diagnosis Osteoarthritis of glenohumeral joint, left  Tendinopathy of left rotator cuff   PT Tx Diagnosis Osteoarthritis of glenohumeral joint, left Tendinopathy of left rotator cuff   Progress Note/Certification   Start of Care Date 06/05/25   Onset of illness/injury or Date of Surgery 03/09/25   Therapy Frequency 1x every 2-3 weeks   Predicted Duration 8 weeks   Certification date from 06/05/25   Certification date to 07/31/25   Progress Note Due Date 07/31/25   Progress Note Completed Date 06/05/25   GOALS   PT Goals 2;3   PT Goal 1   Goal Identifier Sleep   Goal Description Pt will be able to sleep throughout the night without waking from pain for >1 week   Goal Progress Met   Target Date 07/31/25   Date Met 06/26/25   Subjective Report   Subjective Report Pt reports one day she started to have pain in her lateral shoulder. The pain went away but did hold off on the exercises. Pain: 0/10   Objective Measures   Objective Measures Objective Measure 1   Objective Measure 1   Objective Measure Shoulder AROM   Details WNL for all   Treatment Interventions (PT)   Interventions Therapeutic Procedure/Exercise   Therapeutic Procedure/Exercise   Therapeutic Procedures: strength, endurance, ROM, flexibility minutes (06408) 24   Ther Proc 1 Shoulder Theraband Rows   Ther Proc 1 - Details x 10 reps with YTB- cues to keep elbows just in by side   PTRx Ther Proc 1 Shoulder Theraband External Rotation   PTRx Ther Proc 1 - Details x 10 reps - reduced to YTB   PTRx Ther Proc 2 Pec Stretch Doorway   PTRx Ther Proc 2 - Details 2 x 30 sec hold    Skilled Intervention Modifications  of HEP for L shoulder strengthening and posture to reduce pain and improve function   Patient Response/Progress good tolerance, pt questions answered   Education   Learner/Method Patient;Pictures/Video   Plan   Home program PTRx   Plan for next session discharge to self-management   Total Session Time   Timed Code Treatment Minutes 24   Total Treatment Time (sum of timed and untimed services) 24

## 2025-07-02 ENCOUNTER — THERAPY VISIT (OUTPATIENT)
Dept: OCCUPATIONAL THERAPY | Facility: CLINIC | Age: 80
End: 2025-07-02
Payer: MEDICARE

## 2025-07-02 DIAGNOSIS — Z78.9 ALTERATION IN INSTRUMENTAL ACTIVITIES OF DAILY LIVING (IADL): ICD-10-CM

## 2025-07-02 DIAGNOSIS — Z78.9 DECREASED ACTIVITIES OF DAILY LIVING (ADL): ICD-10-CM

## 2025-07-02 DIAGNOSIS — R41.3 MEMORY LOSS: Primary | ICD-10-CM

## 2025-07-02 PROCEDURE — 97535 SELF CARE MNGMENT TRAINING: CPT | Mod: GO | Performed by: OCCUPATIONAL THERAPIST

## 2025-07-02 NOTE — PROGRESS NOTES
DISCHARGE  Reason for Discharge: Patient has met or worked on all goals.    Equipment Issued:     Discharge Plan: Patient to continue home program as needed to implement memory compensation stategies    Referring Provider:  Jamila Damon     07/02/25 0500   Appointment Info   Treating Provider Marcia LÓPEZ OTR/L   Visits Used 5   Medical Diagnosis Memory Loss   OT Tx Diagnosis decreased adls/iadls   Other pertinent information Pt is 81 yo female who has been noticing memory changes.  Pt was accompanied by her  Shaw.   Progress Note/Certification   Start Of Care Date 05/20/25   Onset of Illness/Injury or Date of Surgery 04/24/25  (orders date)   Therapy Frequency 1x/week   Predicted Duration 2 weeks   Certification date from 07/01/25   Certification date to 07/14/25   Progress Note Completed Date 06/25/25   OT Goal 1   Goal Identifier Memory compensation strategies   Goal Description Pt will complete numerical reasoning,  complex prob solving, and  short-term memory tasks (using compensatory strategies prn) in the appropriate amount of time and with 90% accuracy to improve problem solving ability and increase safety and independence with ADL/IADLs at home, work and in the community   Goal Progress Goal Met   Target Date 07/14/25   Date Met 07/02/25   OT Goal 2   Goal Identifier Safety with community mobility   Goal Description Patient will demonstrate WFLs visual scanning (organized scanning pattern), visual reaction time and divided attention skills using compensatory strategies prn, for safe independent community mobility (crossing the street, driving etc.) and increased safety with ADL/IADLs at home  by scoring WNLs on all modes of the Dynavision and pen/paper scanning tasks.   Goal Progress Goal Addressed   Target Date 07/14/25   Subjective Report   Subjective Report Pt reports her grandson liked the card trick and called to tell them he figured it out.   Objective Measure 1   Objective Measure  MoCA   Objective Measure 2   Objective Measure Cognitive Performance Test   Objective Measure 3   Objective Measure Dynavision   Details Mode A 50  Mode B 22, 19 BNL where WNL is 42 and greater   Self Care/Home Management   Self-Care/Home Mgmt/ADL, Compensatory, Meal Prep Minutes (38645) 75 Minutes   Self Care 1 - Details Memory compensation strategies   Skilled Intervention Pt completed all fxl math with  % accuracy over six worksheets and returned them. Pt instructed in memory strategies page 2 including repetition, language strategies, creating video in mind and so forth.  Pt verbalized her understanding.  Pt complete memory with distractions page 2 using memory strategies with 90 % accuracy, an improvement of 30 percent compared to completion without strategies.  Pt able to implement repetition, video in her mind, and making a mental connection. Pt instructed in 13 card trick to address visual memory and sequencing strategies.  Pt demonstrated her understanding with step by step instructions and was given handout for reference.  Pt also instructed in spot it game for visual scanning.  Pt demonstrated her understanding.   Patient Response/Progress effortful, cooperative   Plan   Plan for next session discharge   Total Session Time   Timed Code Treatment Minutes 75   Total Treatment Time (sum of timed and untimed services) 75

## 2025-07-15 NOTE — PROGRESS NOTES
NEW CONSULTATION   Jul 17, 2025     Annette Holder is a 80 year old woman who presents with family history of breast cancer. She was referred by Dr. Damon.    HPI:    Family history of mother and 2 maternal aunts with breast cancer.     She thinks she had negative gentic testing 10 years ago. She was doing high risk screening at [place] years ago. S/p tamoxifen/raloxifene x3 years.     Today she denies any breast mass, skin change, nipple inversion or nipple discharge.     BREAST-SPECIFIC HISTORY:    Previous breast imaging: Yes  - 1/6/16 Smammo BI-RADS 1  - 12/12/16 Smammo BI-RADS 1  - 10/31/17 Smammo BI-RADS 1  - 10/18/18 Smammo BI-RADS 1  - 8/3/22 Smammo: right breast asymmetry BI-RADS 0  - 9/30/24 Smammo BI-RADS 1    Prior breast biopsies/surgeries: yes  - left breast biopsy many years ago, benign.     Prior history of breast cancer or DCIS: No  Prior radiation history: No    GYN HISTORY:  Age at 1st pregnancy: 22 1969.   Age at menarche: highschool   Menopausal: unknown  Menopausal hormone replacement therapy: No   Increased risk with more than 3-5 years with combination estrogen/progesterone therapy. (<1.67 HT okay, 1.67-5 caution, >5 avoid) Counseling regarding anticipated benefits and risk of menopausal hormonal therapy with strong considerations of non-hormonal therapies for symptom relief.     RISK ASSESSMENT:  Breast density: heterogeneously dense   < 20% lifetime risk by JERE  Katrin: 3.3% 5 year risk   JERE/Mehran: 1.8% lifetime risk    FAMILY HISTORY:  Breast ca: Yes  - mother, 70's   - maternal aunt, 40's  - maternal aunt, 54 (2 maternal aunts)    PAST MEDICAL HISTORY  Past Medical History:   Diagnosis Date    S/P total knee arthroplasty, right 08/28/2024     PAST SURGICAL HISTORY   No past surgical history on file.    MEDICATIONS  Current Outpatient Medications   Medication Sig Dispense Refill    aspirin (ASA) 81 MG chewable tablet Take 81 mg by mouth daily.      calcium carbonate 750 MG CHEW  "Take 750 mg by mouth daily.      meclizine (ANTIVERT) 12.5 MG tablet Take 12.5 mg by mouth 3 times daily as needed for dizziness.      Multiple Vitamin (ONE-A-DAY ESSENTIAL) TABS Take 1 tablet by mouth daily.      Omega-3 Fatty Acids (OMEGA-3 EPA FISH OIL PO) Take by mouth.      pravastatin (PRAVACHOL) 20 MG tablet Take 1 tablet (20 mg) by mouth at bedtime. 90 tablet 3     ALLERGIES  Allergies   Allergen Reactions    Animal Dander       SOCIAL HISTORY:  Smokes: No  EtOH: Yes, daily  Exercise: active   Seen with  today    EXAM  BP (!) 178/90 (BP Location: Right arm, Patient Position: Sitting, Cuff Size: Adult Small)   Pulse 70   Temp 97.6  F (36.4  C) (Oral)   Resp 16   Ht 1.71 m (5' 7.32\")   Wt 60 kg (132 lb 3.2 oz)   SpO2 98%   BMI 20.51 kg/m     PHYSICAL EXAM  Respiratory: breathing non labored.   Breasts: Examination was done in both the upright and supine positions.  Breasts are symmetrical . No masses noted. No skin or nipple changes. No nipple discharge. Left 10:00 scar well healed with 1 cm area of palpable soft tissue.   No clavicular, cervical, or axillary lymphadenopathy.     INVESTIGATIONS:     ASSESSMENT/PLAN:    Annette Holder is a 80 year old woman with family history of breast cancer. A risk assessment was performed today.     1) Family history of breast cancer and heterogeneously dense breast tissue.   Continue annual screening mammograms while in good health.   -  Be familiar with your breast and how they normally feel and appear. Promptly report any changes.  - Referral to genetic counseling  - Screening mammogram with tomosynthesis due: 10/1/25    2) Lifestyle Modifications were provided.   - Maintain your best healthy weight. Higher body fat and adult weight gain is associated with increased risk for breast cancer. This increase in risk has been attributed to increase in circulating endogenous estrogen levels from fat tissue.   - Any alcohol intake increases the risk for breast " cancer. If you choose to drink alcohol limit alcohol consumption to less than 1 drink (1 ounce of liquor, 6 ounces of wine, or 8 ounces of beer) per day or less than 3 drinks per week.  - Be active daily and void being sedentary.   - Vitamin D may decrease the risk of developing breast cancer.     Tamra Chan PA-C    30 minutes spent on the date of the encounter doing chart review, review of test results, interpretation of tests, patient visit and documentation.

## 2025-07-17 ENCOUNTER — ONCOLOGY VISIT (OUTPATIENT)
Dept: SURGERY | Facility: CLINIC | Age: 80
End: 2025-07-17
Attending: INTERNAL MEDICINE
Payer: MEDICARE

## 2025-07-17 ENCOUNTER — PATIENT OUTREACH (OUTPATIENT)
Dept: ONCOLOGY | Facility: CLINIC | Age: 80
End: 2025-07-17

## 2025-07-17 VITALS
SYSTOLIC BLOOD PRESSURE: 178 MMHG | HEART RATE: 70 BPM | RESPIRATION RATE: 16 BRPM | WEIGHT: 132.2 LBS | BODY MASS INDEX: 20.75 KG/M2 | DIASTOLIC BLOOD PRESSURE: 90 MMHG | HEIGHT: 67 IN | TEMPERATURE: 97.6 F | OXYGEN SATURATION: 98 %

## 2025-07-17 DIAGNOSIS — Z12.31 ENCOUNTER FOR SCREENING MAMMOGRAM FOR MALIGNANT NEOPLASM OF BREAST: ICD-10-CM

## 2025-07-17 DIAGNOSIS — Z80.3 FAMILY HISTORY OF MALIGNANT NEOPLASM OF BREAST: ICD-10-CM

## 2025-07-17 PROCEDURE — G0463 HOSPITAL OUTPT CLINIC VISIT: HCPCS | Performed by: PHYSICIAN ASSISTANT

## 2025-07-17 ASSESSMENT — PAIN SCALES - GENERAL: PAINLEVEL_OUTOF10: NO PAIN (0)

## 2025-07-17 NOTE — PATIENT INSTRUCTIONS
Annette Holder is a 80 year old woman with family history of breast cancer. A risk assessment was performed today.     1) Family history of breast cancer and heterogeneously dense breast tissue.   Continue annual screening mammograms while in good health.   -  Be familiar with your breast and how they normally feel and appear. Promptly report any changes.  - Screening mammogram with tomosynthesis due: 10/1/25    2) Lifestyle Modifications were provided.   - Maintain your best healthy weight. Higher body fat and adult weight gain is associated with increased risk for breast cancer. This increase in risk has been attributed to increase in circulating endogenous estrogen levels from fat tissue.   - Any alcohol intake increases the risk for breast cancer. If you choose to drink alcohol limit alcohol consumption to less than 1 drink (1 ounce of liquor, 6 ounces of wine, or 8 ounces of beer) per day or less than 3 drinks per week.  - Be active daily and void being sedentary.   - Vitamin D may decrease the risk of developing breast cancer.

## 2025-07-17 NOTE — LETTER
7/17/2025      Annette Holder  [address]  [place] [address]      Dear Colleague,    Thank you for referring your patient, Annette Holder, to the [facility]. Please see a copy of my visit note below.    NEW CONSULTATION   Jul 17, 2025     Annette Holder is a 80 year old woman who presents with family history of breast cancer. She was referred by Dr. Dmaon.    HPI:    Family history of mother and 2 maternal aunts with breast cancer.     She thinks she had negative gentic testing 10 years ago. She was doing high risk screening at [place] years ago. S/p tamoxifen/raloxifene x3 years.     Today she denies any breast mass, skin change, nipple inversion or nipple discharge.     BREAST-SPECIFIC HISTORY:    Previous breast imaging: Yes  - 1/6/16 Smammo BI-RADS 1  - 12/12/16 Smammo BI-RADS 1  - 10/31/17 Smammo BI-RADS 1  - 10/18/18 Smammo BI-RADS 1  - 8/3/22 Smammo: right breast asymmetry BI-RADS 0  - 9/30/24 Smammo BI-RADS 1    Prior breast biopsies/surgeries: yes  - left breast biopsy many years ago, benign.     Prior history of breast cancer or DCIS: No  Prior radiation history: No    GYN HISTORY:  Age at 1st pregnancy: 22 1969.   Age at menarche: highschool   Menopausal: unknown  Menopausal hormone replacement therapy: No   Increased risk with more than 3-5 years with combination estrogen/progesterone therapy. (<1.67 HT okay, 1.67-5 caution, >5 avoid) Counseling regarding anticipated benefits and risk of menopausal hormonal therapy with strong considerations of non-hormonal therapies for symptom relief.     RISK ASSESSMENT:  Breast density: heterogeneously dense   < 20% lifetime risk by JERE  Katrin: 3.3% 5 year risk   JERE/Mehran: 1.8% lifetime risk    FAMILY HISTORY:  Breast ca: Yes  - mother, 70's   - maternal aunt, 40's  - maternal aunt, 54 (2 maternal aunts)    PAST MEDICAL HISTORY  Past Medical History:   Diagnosis Date     S/P total knee arthroplasty, right  "08/28/2024     PAST SURGICAL HISTORY   No past surgical history on file.    MEDICATIONS  Current Outpatient Medications   Medication Sig Dispense Refill     aspirin (ASA) 81 MG chewable tablet Take 81 mg by mouth daily.       calcium carbonate 750 MG CHEW Take 750 mg by mouth daily.       meclizine (ANTIVERT) 12.5 MG tablet Take 12.5 mg by mouth 3 times daily as needed for dizziness.       Multiple Vitamin (ONE-A-DAY ESSENTIAL) TABS Take 1 tablet by mouth daily.       Omega-3 Fatty Acids (OMEGA-3 EPA FISH OIL PO) Take by mouth.       pravastatin (PRAVACHOL) 20 MG tablet Take 1 tablet (20 mg) by mouth at bedtime. 90 tablet 3     ALLERGIES  Allergies   Allergen Reactions     Animal Dander       SOCIAL HISTORY:  Smokes: No  EtOH: Yes, daily  Exercise: active   Seen with  today    EXAM  BP (!) 178/90 (BP Location: Right arm, Patient Position: Sitting, Cuff Size: Adult Small)   Pulse 70   Temp 97.6  F (36.4  C) (Oral)   Resp 16   Ht 1.71 m (5' 7.32\")   Wt 60 kg (132 lb 3.2 oz)   SpO2 98%   BMI 20.51 kg/m     PHYSICAL EXAM  Respiratory: breathing non labored.   Breasts: Examination was done in both the upright and supine positions.  Breasts are symmetrical . No masses noted. No skin or nipple changes. No nipple discharge. Left 10:00 scar well healed with 1 cm area of palpable soft tissue.   No clavicular, cervical, or axillary lymphadenopathy.     INVESTIGATIONS:     ASSESSMENT/PLAN:    Annette Holder is a 80 year old woman with family history of breast cancer. A risk assessment was performed today.     1) Family history of breast cancer and heterogeneously dense breast tissue.   Continue annual screening mammograms while in good health.   -  Be familiar with your breast and how they normally feel and appear. Promptly report any changes.  - Referral to genetic counseling  - Screening mammogram with tomosynthesis due: 10/1/25    2) Lifestyle Modifications were provided.   - Maintain your best healthy " weight. Higher body fat and adult weight gain is associated with increased risk for breast cancer. This increase in risk has been attributed to increase in circulating endogenous estrogen levels from fat tissue.   - Any alcohol intake increases the risk for breast cancer. If you choose to drink alcohol limit alcohol consumption to less than 1 drink (1 ounce of liquor, 6 ounces of wine, or 8 ounces of beer) per day or less than 3 drinks per week.  - Be active daily and void being sedentary.   - Vitamin D may decrease the risk of developing breast cancer.     Tamra Chan PA-C    30 minutes spent on the date of the encounter doing chart review, review of test results, interpretation of tests, patient visit and documentation.      Again, thank you for allowing me to participate in the care of your patient.        Sincerely,        Tamra Chan PA-C    Electronically signed

## 2025-07-17 NOTE — NURSING NOTE
"Oncology Rooming Note    July 17, 2025 8:56 AM   Annette Holder is a 80 year old female who presents for:    Chief Complaint   Patient presents with    Oncology Clinic Visit     Family history of malignant neoplasm of breast     Initial Vitals: BP (!) 178/90 (BP Location: Right arm, Patient Position: Sitting, Cuff Size: Adult Small)   Pulse 70   Temp 97.6  F (36.4  C) (Oral)   Resp 16   Ht 1.71 m (5' 7.32\")   Wt 60 kg (132 lb 3.2 oz)   SpO2 98%   BMI 20.51 kg/m   Estimated body mass index is 20.51 kg/m  as calculated from the following:    Height as of this encounter: 1.71 m (5' 7.32\").    Weight as of this encounter: 60 kg (132 lb 3.2 oz). Body surface area is 1.69 meters squared.  No Pain (0) Comment: Data Unavailable   No LMP recorded. Patient has had a hysterectomy.  Allergies reviewed: Yes  Medications reviewed: Yes    Medications: Medication refills not needed today.  Pharmacy name entered into [facility]:    [id number] - [facility] [address] [id number] [address] PECK:  Did this patient require a PHQ9?: No      Clinical concerns: none      Jazz Hinton LPN            "
Statement Selected

## 2025-07-17 NOTE — PROGRESS NOTES
New Patient Oncology Nurse Navigator Note     Referring provider: Tamra Chan PA-C      Referring Clinic/Organization: [facility]      Referred to (specialty:) Genetic Counseling and Cancer Risk Management     Requested provider (if applicable): NA     Date Referral Received: July 17, 2025     Evaluation for:  Z80.3 (ICD-10-CM) - Family history of malignant neoplasm of breast      Payor: MEDICARE / Plan: MEDICARE / Product Type: Medicare /     July 17, 2025  Referral received and reviewed.   Sent to NPS to schedule.     Tasia BOTELLON, RN, OCN  Oncology Nurse Navigator   [facility]  Cancer Care Service Line   New Patient Hem/Onc Scheduling / Referrals: [phone] (fax: [phone] )